# Patient Record
Sex: MALE | Race: BLACK OR AFRICAN AMERICAN | Employment: STUDENT | URBAN - METROPOLITAN AREA
[De-identification: names, ages, dates, MRNs, and addresses within clinical notes are randomized per-mention and may not be internally consistent; named-entity substitution may affect disease eponyms.]

---
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
## 2021-01-14 DIAGNOSIS — G47.00 INSOMNIA, UNSPECIFIED TYPE: ICD-10-CM

## 2021-01-14 DIAGNOSIS — J45.40 MODERATE PERSISTENT ASTHMA WITHOUT COMPLICATION: ICD-10-CM

## 2021-01-14 RX ORDER — MONTELUKAST SODIUM 10 MG/1
TABLET ORAL
Qty: 90 TAB | Refills: 3 | Status: CANCELLED | OUTPATIENT
Start: 2021-01-14

## 2021-01-14 RX ORDER — MELATONIN 10 MG
10 CAPSULE ORAL
Qty: 90 CAP | Refills: 3 | Status: CANCELLED | OUTPATIENT
Start: 2021-01-14

## 2021-01-14 NOTE — TELEPHONE ENCOUNTER
----- Message from Balwinder Lopez sent at 1/14/2021  7:47 AM EST -----  Regarding: YAQUELIN Corona/Refill  Medication Refill    Caller (if not patient):Juaquin Gant      Relationship of caller (if not patient):grandmother      Best contact number(s):[phone]      Name of medication and dosage if known: miconazole nitrate cream antifungal 2% , melatonin 10 mg and Nitrocine 2%      Is patient out of this medication (yes/no): yes      Pharmacy name:[facility]    Pharmacy listed in chart? (yes/no):yes  Pharmacy phone [phone]      Details to clarify the request:Pt grandmother requesting a refill . Pt grandmother needs a order for speech therapy at [facility] if possible  .       Balwinder Lopez

## 2021-01-14 NOTE — TELEPHONE ENCOUNTER
Duplicate request - Singulair 10 mg #90 wit 3 refills & Melatonin 10 mg caps #90 with 3 refills both was sent to [facility] on 09/29/2020. Requested Prescriptions     Pending Prescriptions Disp Refills    melatonin 10 mg capsule 90 Cap 3     Sig: Take 1 Cap by mouth nightly.     montelukast (SINGULAIR) 10 mg tablet 90 Tab 3     Sig: TAKE 1 TABLET BY MOUTH EVERY DAY for allergies and asthma

## 2021-01-14 NOTE — TELEPHONE ENCOUNTER
----- Message from Gavino Thomson sent at 1/14/2021  9:44 AM EST -----  Regarding: LYUBOV Corona/Refill  Medication Refill    Caller (if not patient): Amarilis (Grandmother)      Relationship of caller (if not patient): Grandmother      Best contact number(s): [phone]      Name of medication and dosage if known: Monetelukast 10 mg tab, Melatonin CR 10 mg tab        Is patient out of this medication (yes/no):  No, about 4 pills       Pharmacy name: [facility]    Pharmacy listed in chart? (yes/no): yes  Pharmacy phone number: [phone]      Details to clarify the request:      Gavino Thomson

## 2021-01-25 ENCOUNTER — TELEPHONE (OUTPATIENT)
Dept: FAMILY MEDICINE CLINIC | Age: 21
End: 2021-01-25

## 2021-01-25 DIAGNOSIS — F84.0 AUTISM: Primary | ICD-10-CM

## 2021-01-25 DIAGNOSIS — G47.00 INSOMNIA, UNSPECIFIED TYPE: ICD-10-CM

## 2021-01-25 DIAGNOSIS — L73.9 FOLLICULITIS: ICD-10-CM

## 2021-01-25 DIAGNOSIS — J45.40 MODERATE PERSISTENT ASTHMA WITHOUT COMPLICATION: ICD-10-CM

## 2021-01-25 DIAGNOSIS — B35.6 TINEA CRURIS: ICD-10-CM

## 2021-01-25 RX ORDER — MONTELUKAST SODIUM 10 MG/1
TABLET ORAL
Qty: 90 TAB | Refills: 3 | Status: CANCELLED | OUTPATIENT
Start: 2021-01-25

## 2021-01-25 RX ORDER — KETOCONAZOLE 20 MG/G
CREAM TOPICAL 2 TIMES DAILY
Qty: 60 G | Refills: 2 | Status: SHIPPED | OUTPATIENT
Start: 2021-01-25

## 2021-01-25 RX ORDER — MUPIROCIN 20 MG/G
OINTMENT TOPICAL 2 TIMES DAILY
Qty: 22 G | Refills: 1 | Status: SHIPPED | OUTPATIENT
Start: 2021-01-25 | End: 2021-07-07 | Stop reason: SDUPTHER

## 2021-01-25 RX ORDER — MELATONIN 10 MG
10 CAPSULE ORAL
Qty: 90 CAP | Refills: 3 | Status: CANCELLED | OUTPATIENT
Start: 2021-01-25

## 2021-01-25 NOTE — TELEPHONE ENCOUNTER
----- Message from Ector Taylor sent at 1/23/2021 12:42 PM EST -----  Regarding: LYUBOV Corona/ telephone  General Message/Vendor Calls    Caller's first and last name: Mai Spring (grandmother)       Reason for call: caller requesting refill on medications for pt       Callback required yes/no and why: yes       Best contact number(s): [phone]      Details to clarify the request: n/a       Ector Taylor

## 2021-01-25 NOTE — TELEPHONE ENCOUNTER
Neymar Frost called requesting an order for speech therapy.         [facility]#[phone]

## 2021-01-25 NOTE — TELEPHONE ENCOUNTER
Pt's grandmother's wanted to check on status of paperwork that was dropped off two weeks ago for Kayla Read to fill out. Pt has appointment with him tomorrow morning.            [facility]#[phone]

## 2021-01-25 NOTE — TELEPHONE ENCOUNTER
Duplicate request: Melatonin 10 mg #90 with 3 refills & Singulair 10 mg #90 with 3 refills was sent to [facility] on 09/29/2020. Last visit 10/27/2020 LYUBOV Sales   Next appointment 01/26/2021 LYUBOV Corona   Previous refill encounter(s)   09/29/2020 Bactroban #2 grams with 2 refills,  10/27/2020 Nizoral cream #60 grams with 2 refills     Requested Prescriptions     Pending Prescriptions Disp Refills    ketoconazole (NIZORAL) 2 % topical cream 60 g 2     Sig: Apply  to affected area two (2) times a day.  melatonin 10 mg capsule 90 Cap 3     Sig: Take 1 Cap by mouth nightly.  montelukast (SINGULAIR) 10 mg tablet 90 Tab 3     Sig: TAKE 1 TABLET BY MOUTH EVERY DAY for allergies and asthma    mupirocin (BACTROBAN) 2 % ointment 22 g 1     Sig: Apply  to affected area two (2) times a day.

## 2021-02-12 NOTE — TELEPHONE ENCOUNTER
NP Cj,    Fax request for a new rx for miconazole foot cream by patient. Last rx was 19 and shows  after 20 doses. Entered for bid x 10 days with 1 refill. Attached new rx if appropriate. Thanks, Candie Dean    Last Visit: 20 Suzanna Muir  Next Appointment: 21 LYUBOV Corona  Previous Refill Encounter(s): 19 15g + 1    Requested Prescriptions     Pending Prescriptions Disp Refills    miconazole (MICOTIN) 2 % topical cream 15 g 1     Sig: Apply  to affected area two (2) times a day.

## 2021-02-15 RX ORDER — DOXYLAMINE SUCCINATE 25 MG
TABLET ORAL 2 TIMES DAILY
Qty: 15 G | Refills: 1 | Status: SHIPPED | OUTPATIENT
Start: 2021-02-15 | End: 2021-07-07 | Stop reason: SDUPTHER

## 2021-03-29 ENCOUNTER — TELEPHONE (OUTPATIENT)
Dept: FAMILY MEDICINE CLINIC | Age: 21
End: 2021-03-29

## 2021-03-29 NOTE — TELEPHONE ENCOUNTER
----- Message from Reid Santillan sent at 3/29/2021  8:41 AM EDT -----  Regarding: Javier Corona/Telephone  General Message/Vendor Calls    Caller's first and last name:Christiana(Grandmother) Malik      Reason for call: requesting to know what kind of medication or cream to use for a bleeding broken nail and cut on a finger of the pt      Callback required yes/no and why: yes      Best contact number(s): [phone]      Details to clarify the request: requesting to discuss allergy and sleep meds before the appt on this Thurs.       Reid Santillan

## 2021-03-29 NOTE — TELEPHONE ENCOUNTER
Called Christiana back, she states that her dtr already took pt to the ER @ [facility] for his finger. She said that they are scheduled for an appt on Thursday. They will discuss the allergy and sleeping med then.

## 2021-04-01 ENCOUNTER — OFFICE VISIT (OUTPATIENT)
Dept: FAMILY MEDICINE CLINIC | Age: 21
End: 2021-04-01
Payer: MEDICAID

## 2021-04-01 VITALS
HEIGHT: 70 IN | DIASTOLIC BLOOD PRESSURE: 74 MMHG | TEMPERATURE: 96.6 F | BODY MASS INDEX: 26.34 KG/M2 | SYSTOLIC BLOOD PRESSURE: 129 MMHG | RESPIRATION RATE: 16 BRPM | OXYGEN SATURATION: 96 % | WEIGHT: 184 LBS | HEART RATE: 83 BPM

## 2021-04-01 DIAGNOSIS — S61.012D LACERATION OF LEFT THUMB WITHOUT FOREIGN BODY WITHOUT DAMAGE TO NAIL, SUBSEQUENT ENCOUNTER: ICD-10-CM

## 2021-04-01 DIAGNOSIS — K13.0 DRY LIPS: ICD-10-CM

## 2021-04-01 DIAGNOSIS — G47.00 INSOMNIA, UNSPECIFIED TYPE: ICD-10-CM

## 2021-04-01 DIAGNOSIS — J45.40 MODERATE PERSISTENT ASTHMA WITHOUT COMPLICATION: Primary | ICD-10-CM

## 2021-04-01 PROCEDURE — 99214 OFFICE O/P EST MOD 30 MIN: CPT | Performed by: NURSE PRACTITIONER

## 2021-04-01 RX ORDER — PETROLATUM 42 G/100G
OINTMENT TOPICAL AS NEEDED
Qty: 15 G | Status: SHIPPED | OUTPATIENT
Start: 2021-04-01

## 2021-04-01 RX ORDER — MELATONIN 10 MG
10 CAPSULE ORAL
Qty: 90 CAP | Refills: 3 | Status: SHIPPED | OUTPATIENT
Start: 2021-04-01 | End: 2021-07-07 | Stop reason: SDUPTHER

## 2021-04-01 NOTE — PROGRESS NOTES
Identified pt with two pt identifiers(name and ). Reviewed record in preparation for visit and have obtained necessary documentation. Chief Complaint   Patient presents with    Asthma    Hand Pain     left thumb        Health Maintenance Due   Topic    Hepatitis C Screening     Pneumococcal 0-64 years (1 of 1 - PPSV23)    HPV Age 9Y-34Y (4 - Male 2-dose series)        Visit Vitals  /74 (BP 1 Location: Right arm, BP Patient Position: Sitting, BP Cuff Size: Large adult)   Pulse 83   Temp (!) 96.6 °F (35.9 °C) (Oral)   Resp 16   Ht 5' 10\" (1.778 m)   Wt 184 lb (83.5 kg)   SpO2 96%   BMI 26.40 kg/m²     Pain Scale: /10    Coordination of Care Questionnaire:  :   1. Have you been to the ER, urgent care clinic since your last visit? Hospitalized since your last visit? [facility] 2021 for finger    2. Have you seen or consulted any other health care providers outside of the [address] [place] [address] since your last visit? Include any pap smears or colon screening.  yes

## 2021-04-01 NOTE — PROGRESS NOTES
[facility] Note    Citlalli Albert is a 24 y.o. male who was seen in clinic today (4/1/2021). Subjective:  Laceration  Patient presents for evaluation of a laceration to the left thumb. Injury occurred 5 days ago. The mechanism of the wound was nail clippers. Patient was seen at [address] [place] [address] 2 days after injury. He was unable to have wound closed given his length of time. Family has been cleaning wound with peroxide and applying antibiotic ointment. Asthma Review:  The patient is being seen for follow up of asthma, not currently in exacerbation. Seen by pediatric pulmonology. Patient will not cooperate for sleep study. Asthma symptoms occur: infrequently. Wheezing when present is described as mild-moderate and easily relieved with rescue bronchodilator. Current limitations in activity from asthma: none. Number of days of school or work missed in the last month: 0. Frequency of use of quick-relief meds: weekly. Regimen compliance: The patient reports adherence to this regimen. Patient does not smoke cigarettes. Prior to Admission medications    Medication Sig Start Date End Date Taking? Authorizing Provider   melatonin 10 mg capsule Take 1 Cap by mouth nightly. 4/1/21  Yes Sue Corona NP   mineral oil-hydrophil petrolat (AQUAPHOR) ointment Apply  to affected area as needed for Dry Skin. Please dispense Aquaphor lip repair for patient 4/1/21  Yes Sue Corona NP   miconazole (MICOTIN) 2 % topical cream Apply  to affected area two (2) times a day. 2/15/21  Yes Sue Corona NP   ketoconazole (NIZORAL) 2 % topical cream Apply  to affected area two (2) times a day. 1/25/21  Yes Sue Corona NP   mupirocin (BACTROBAN) 2 % ointment Apply  to affected area two (2) times a day. 1/25/21  Yes Sue Corona NP   budesonide-formoteroL (SYMBICORT) 160-4.5 mcg/actuation HFAA Take 2 Puffs by inhalation two (2) times a day. 10/27/20  Yes Kesha Corona NP   cloNIDine HCL (CATAPRES) 0.1 mg tablet Take 1 Tab by mouth nightly. 9/29/20  Yes Kesha Corona NP   montelukast (SINGULAIR) 10 mg tablet TAKE 1 TABLET BY MOUTH EVERY DAY for allergies and asthma 9/29/20  Yes Kesha Corona NP   albuterol (PROVENTIL VENTOLIN) 2.5 mg /3 mL (0.083 %) nebu INHALE 1 VIAL BY NEBULIZATION EVERY 4 HOURS AS NEEDED FOR WHEEZING OR COUGH 9/2/20  Yes Kesha Corona NP   naproxen (NAPROSYN) 500 mg tablet Take 1 Tab by mouth two (2) times daily as needed for Headache. 7/23/20  Yes Kesha Corona NP   cetirizine (ZYRTEC) 10 mg tablet Take 1 Tab by mouth daily. For allergies 2/20/20  Yes Kesha Corona NP   albuterol (PROVENTIL HFA, VENTOLIN HFA, PROAIR HFA) 90 mcg/actuation inhaler Take 2-4 Puffs by inhalation every four (4) hours as needed for Wheezing or Shortness of Breath. 12/12/19  Yes Yanique Jasmine MD   Ibuprofen-diphenhydrAMINE (ADVIL PM) 200-38 mg tab Take 1 Tab by mouth nightly. Patient taking differently: Take 1 Tab by mouth as needed. 8/23/19  Yes Kesha Corona NP   triamcinolone acetonide (KENALOG) 0.1 % topical cream Apply  to affected area two (2) times a day. use thin layer 6/26/19  Yes Kesha Corona NP   glycerin-mineral oil-lanolin (EUCERIN PLUS) topical cream Apply  to affected area as needed for Dry Skin. 6/26/19  Yes Sade Mueller NP   benztropine (COGENTIN) 0.5 mg tablet Take 0.5 mg by mouth two (2) times a day. Yes Provider, Historical   hydrOXYzine HCl (ATARAX) 10 mg tablet Take  by mouth three (3) times daily as needed for Itching. Yes Provider, Historical   polyethylene glycol (MIRALAX) 17 gram/dose powder Take 17 g by mouth daily. Yes Provider, Historical   risperiDONE (RISPERDAL) 1 mg tablet Take 1 mg by mouth three (3) times daily. Yes Provider, Historical   amphetamine-dextroamphetamine (ADDERALL) 15 mg tablet Take 15 mg by mouth.    Yes Other, MD Kane   melatonin 10 mg cap Take 10 mg by mouth nightly. 9/29/20 4/1/21  Nella Villa NP   mineral oil-hydrophil petrolat (AQUAPHOR) ointment Apply  to affected area as needed for Dry Skin. Please dispense Aquaphor lip repair for patient 6/9/20 4/1/21  Nella RobLYUBOV izaguirre          Allergies   Allergen Reactions    Pcn [Penicillins] Swelling    Milk Hives           ROS  See HPI    Objective:   Physical Exam  Vitals signs and nursing note reviewed. Constitutional:       Appearance: He is well-developed. Cardiovascular:      Rate and Rhythm: Normal rate and regular rhythm. Heart sounds: No murmur. No friction rub. No gallop. Pulmonary:      Effort: Pulmonary effort is normal. No respiratory distress. Breath sounds: Normal breath sounds. Neurological:      Mental Status: He is alert and oriented to person, place, and time. Psychiatric:         Speech: Speech normal.         Behavior: Behavior normal.         Thought Content: Thought content normal.           Visit Vitals  /74 (BP 1 Location: Right arm, BP Patient Position: Sitting, BP Cuff Size: Large adult)   Pulse 83   Temp (!) 96.6 °F (35.9 °C) (Oral)   Resp 16   Ht 5' 10\" (1.778 m)   Wt 184 lb (83.5 kg)   SpO2 96%   BMI 26.40 kg/m²       Assessment & Plan:  Diagnoses and all orders for this visit:    1. Moderate persistent asthma without complication  Stable, no changes to current therapy. Patient requires a new nebulizer machine as his current one is broken. 2. Laceration of left thumb without foreign body without damage to nail, subsequent encounter  Resolving. Reviewed wound care with family. No evidence of infection. 3. Insomnia, unspecified type  -     Refill melatonin 10 mg capsule; Take 1 Cap by mouth nightly. 4. Dry lips  -    Refill mineral oil-hydrophil petrolat (AQUAPHOR) ointment; Apply  to affected area as needed for Dry Skin.  Please dispense Aquaphor lip repair for patient      I have discussed the diagnosis with the patient and the intended plan as seen in the above orders. The patient has received an after-visit summary along with patient information handout. I have discussed medication side effects and warnings with the patient as well. Follow-up and Dispositions    · Return if symptoms worsen or fail to improve.            Melody Pizarro NP

## 2021-04-05 DIAGNOSIS — J20.9 BRONCHITIS, SUBACUTE: ICD-10-CM

## 2021-04-05 NOTE — TELEPHONE ENCOUNTER
Charity Vargas with [facility] orthotic and prosthetic is needing patients demographic sheet     Fax [phone]  [facility]# [phone]      Notes were faxed and confirmed

## 2021-04-06 RX ORDER — ALBUTEROL SULFATE 0.83 MG/ML
SOLUTION RESPIRATORY (INHALATION)
Qty: 150 ML | Refills: 1 | Status: SHIPPED | OUTPATIENT
Start: 2021-04-06

## 2021-04-06 RX ORDER — ALBUTEROL SULFATE 90 UG/1
2-4 AEROSOL, METERED RESPIRATORY (INHALATION)
Qty: 1 INHALER | Refills: 3 | Status: SHIPPED | OUTPATIENT
Start: 2021-04-06 | End: 2021-07-07 | Stop reason: SDUPTHER

## 2021-04-06 NOTE — TELEPHONE ENCOUNTER
Last visit 04/01/2021 LYUBOV Yanez   Next appointment Nothing scheduled  Previous refill encounter(s)   09/02/2020 Albuterol Neb Sol #150 with 1 refill,  10/12/2019 Albuterol HFA INH #1 with 3 refills. Requested Prescriptions     Pending Prescriptions Disp Refills    albuterol (PROVENTIL VENTOLIN) 2.5 mg /3 mL (0.083 %) nebu 150 mL 1     Sig: Use 3 ml via nebulizer every 4 hours as needed for wheezing or cough    albuterol (PROVENTIL HFA, VENTOLIN HFA, PROAIR HFA) 90 mcg/actuation inhaler 1 Inhaler 3     Sig: Take 2-4 Puffs by inhalation every four (4) hours as needed for Wheezing or Shortness of Breath.

## 2021-04-06 NOTE — TELEPHONE ENCOUNTER
Patient's grandmother and care giver      . Requested Prescriptions     Pending Prescriptions Disp Refills    albuterol (PROVENTIL VENTOLIN) 2.5 mg /3 mL (0.083 %) nebu 150 mL 1    albuterol (PROVENTIL HFA, VENTOLIN HFA, PROAIR HFA) 90 mcg/actuation inhaler 1 Inhaler 3     Sig: Take 2-4 Puffs by inhalation every four (4) hours as needed for Wheezing or Shortness of Breath.        [facility]#[phone]

## 2021-04-14 ENCOUNTER — TELEPHONE (OUTPATIENT)
Dept: FAMILY MEDICINE CLINIC | Age: 21
End: 2021-04-14

## 2021-04-14 NOTE — TELEPHONE ENCOUNTER
ATR/UTR/LMOVM  Advised forms were faxed to [facility] on 04/05/2021   They need to call [facility] and see what the hold up is. Provided them the number. We did receive confirmation our fax was received.

## 2021-04-14 NOTE — TELEPHONE ENCOUNTER
----- Message from [facility] sent at 4/14/2021 11:07 AM EDT -----  Regarding: LYUBOV Corona/Telephone  General Message/Vendor Calls    Caller's first and last name:  [facility])      Reason for call:  Status on nebulizer      Callback required yes/no and why:  Y      Best contact number(s):  [phone]      Details to clarify the request:  Ms. Lakesha Meier is requesting a call back with status on getting a nebulizer machine. She stated this is her third call trying to find out when she will be getting this machine, and no one has called her back. She needs to know what's going on with placing an order for a nebulizer for the patient.       [facility]

## 2021-04-21 ENCOUNTER — TELEPHONE (OUTPATIENT)
Dept: FAMILY MEDICINE CLINIC | Age: 21
End: 2021-04-21

## 2021-04-21 NOTE — LETTER
NOTIFICATION TO WORK / SCHOOL 
 
4/22/2021 1:51 PM 
 
Mr. Franco Re [address] [facility] [address] To Whom It May Concern: 
 
Gregg Boggs is currently under the care of TREVA Lind 54. He suffers from asthma and requires a climate controlled bus to prevent exacerbation of the symptoms. If there are questions or concerns please have the patient contact our office. Sincerely, Corrinne Genera, NP

## 2021-04-21 NOTE — TELEPHONE ENCOUNTER
MsAlyson [place] requesting a letter for summer school (starting 04/26/2021) stating due to his  Medical condition (asthma), he requires a climate control bus (air conditioned).         [facility]#[phone] Routine  care and anticipatory guidance

## 2021-05-11 ENCOUNTER — DOCUMENTATION ONLY (OUTPATIENT)
Dept: FAMILY MEDICINE CLINIC | Age: 21
End: 2021-05-11

## 2021-05-11 ENCOUNTER — OFFICE VISIT (OUTPATIENT)
Dept: FAMILY MEDICINE CLINIC | Age: 21
End: 2021-05-11
Payer: MEDICAID

## 2021-05-11 VITALS
HEIGHT: 70 IN | BODY MASS INDEX: 25.77 KG/M2 | RESPIRATION RATE: 16 BRPM | OXYGEN SATURATION: 96 % | HEART RATE: 99 BPM | DIASTOLIC BLOOD PRESSURE: 67 MMHG | SYSTOLIC BLOOD PRESSURE: 118 MMHG | WEIGHT: 180 LBS | TEMPERATURE: 97.6 F

## 2021-05-11 DIAGNOSIS — Z01.818 PREOP EXAMINATION: Primary | ICD-10-CM

## 2021-05-11 PROCEDURE — 99214 OFFICE O/P EST MOD 30 MIN: CPT | Performed by: NURSE PRACTITIONER

## 2021-05-11 NOTE — PATIENT INSTRUCTIONS
Learning About How to Prepare for Surgery How can you prepare before surgery? You can do some things that will help you safely prepare for surgery. · Understand exactly what surgery is planned. You should know the risks, benefits, and other options. · Tell your doctors ALL the medicines, vitamins, supplements, and herbal remedies you take. Some of these can increase the risk of bleeding. Or they may interact with anesthesia. · Follow your doctor's instructions about which medicines to take or stop before your surgery. ? You may need to stop taking some medicines a week or more before surgery. ? If you take aspirin or some other blood thinner, be sure to talk to your doctor. · Follow any other instructions your doctor gave you. · If you have an advance directive, let your doctor know, and bring a copy to the hospital.  
It may include a living will and a durable power of  for health care. It lets your doctor and loved ones know your health care wishes. If you don't have one, you may want to prepare one. How can you prepare on the day of surgery? Here are some tips about what to do at home before you leave for your surgery. · If your doctor told you to take your medicines on the day of surgery, take them with only a sip of water. · Follow the instructions about when to stop eating and drinking. If you don't, your surgery may be canceled. · Follow your doctor's instructions about when to bathe or shower before your surgery. · Do not shave the surgical site yourself. · Take off all jewelry and piercings. · Take out contact lenses, if you wear them. · Have a picture ID ready to take with you. Your ID will be checked before your surgery. · Know when to call your doctor. Call your doctor if you: 
? Become ill before surgery. ? Need to reschedule. ? Have changed your mind about having the surgery. What happens before surgery?  
Here are some things you can expect to happen before your surgery. · Your picture ID will be checked. · The area of your body that needs surgery is often marked to make sure there are no errors. · You will be kept comfortable and safe by your anesthesia provider. The anesthesia may make you sleep. Or it may just numb the area being worked on. What happens when you are ready to go home? Be sure you have someone drive you home. Anesthesia and pain medicine make it unsafe for you to drive. You will get instructions about recovering from your surgery. This is called a discharge plan. It will cover things like diet, wound care, follow-up care, driving, and getting back to your normal routine. Follow-up care is a key part of your treatment and safety. Be sure to make and go to all appointments, and call your doctor if you are having problems. It's also a good idea to know your test results and keep a list of the medicines you take. Where can you learn more? Go to http://www.gray.com/ Enter Q270 in the search box to learn more about \"Learning About How to Prepare for Surgery. \" Current as of: May 27, 2020               Content Version: 12.8 © 7179-0340 [employer]. Care instructions adapted under license by [employer] (which disclaims liability or warranty for this information). If you have questions about a medical condition or this instruction, always ask your healthcare professional. [address] any warranty or liability for your use of this information.

## 2021-05-11 NOTE — PROGRESS NOTES
Preoperative Evaluation    Date of Exam: 2021    Vanda White is a 24 y.o. male (:2000) who presents for preoperative evaluation for dental procedure. Latex Allergy: no    Medical History:     Past Medical History:   Diagnosis Date    ADHD, impulsive type     Asthma     Autism     Constipation     [facility]    Developmental delay     Headache      Pediatric Neuro: Dr. Antonio Wiseman. MRI of brain normal in 2018. No follow up indicated.  Insomnia     ODD (oppositional defiant disorder)     Sleep apnea     Weight loss 2018    [facility] - recomended clear ensures. Resolved 2018     Allergies: Allergies   Allergen Reactions    Pcn [Penicillins] Swelling    Milk Hives      Medications:     Current Outpatient Medications   Medication Sig    albuterol (PROVENTIL VENTOLIN) 2.5 mg /3 mL (0.083 %) nebu Use 3 ml via nebulizer every 4 hours as needed for wheezing or cough    albuterol (PROVENTIL HFA, VENTOLIN HFA, PROAIR HFA) 90 mcg/actuation inhaler Take 2-4 Puffs by inhalation every four (4) hours as needed for Wheezing or Shortness of Breath.  melatonin 10 mg capsule Take 1 Cap by mouth nightly.  mineral oil-hydrophil petrolat (AQUAPHOR) ointment Apply  to affected area as needed for Dry Skin. Please dispense Aquaphor lip repair for patient    miconazole (MICOTIN) 2 % topical cream Apply  to affected area two (2) times a day.  ketoconazole (NIZORAL) 2 % topical cream Apply  to affected area two (2) times a day.  mupirocin (BACTROBAN) 2 % ointment Apply  to affected area two (2) times a day.  budesonide-formoteroL (SYMBICORT) 160-4.5 mcg/actuation HFAA Take 2 Puffs by inhalation two (2) times a day.  cloNIDine HCL (CATAPRES) 0.1 mg tablet Take 1 Tab by mouth nightly.     montelukast (SINGULAIR) 10 mg tablet TAKE 1 TABLET BY MOUTH EVERY DAY for allergies and asthma    naproxen (NAPROSYN) 500 mg tablet Take 1 Tab by mouth two (2) times daily as needed for Headache.  cetirizine (ZYRTEC) 10 mg tablet Take 1 Tab by mouth daily. For allergies    Ibuprofen-diphenhydrAMINE (ADVIL PM) 200-38 mg tab Take 1 Tab by mouth nightly. (Patient taking differently: Take 1 Tab by mouth as needed.)    triamcinolone acetonide (KENALOG) 0.1 % topical cream Apply  to affected area two (2) times a day. use thin layer    glycerin-mineral oil-lanolin (EUCERIN PLUS) topical cream Apply  to affected area as needed for Dry Skin.  benztropine (COGENTIN) 0.5 mg tablet Take 0.5 mg by mouth two (2) times a day.  hydrOXYzine HCl (ATARAX) 10 mg tablet Take  by mouth three (3) times daily as needed for Itching.  polyethylene glycol (MIRALAX) 17 gram/dose powder Take 17 g by mouth daily.  risperiDONE (RISPERDAL) 1 mg tablet Take 1 mg by mouth three (3) times daily.  amphetamine-dextroamphetamine (ADDERALL) 15 mg tablet Take 15 mg by mouth. No current facility-administered medications for this visit. Surgical History:     Past Surgical History:   Procedure Laterality Date    HX TONSIL AND ADENOIDECTOMY       Social History:     Social History     Socioeconomic History    Marital status: SINGLE     Spouse name: Not on file    Number of children: Not on file    Years of education: Not on file    Highest education level: Not on file   Tobacco Use    Smoking status: Never Smoker    Smokeless tobacco: Never Used   Substance and Sexual Activity    Alcohol use: No    Drug use: No    Sexual activity: Never   Social History Narrative    Lives with Grandmother. Occasional uncle will be in the home. No history of abuse. Goes to school at [employer]. Enjoys school. Also involved in independent living program after school hours.         Anesthesia Complications: None  History of abnormal bleeding : None  History of Blood Transfusions: no  Health Care Directive or Living Will: no    Objective:     ROS: Feeling well. No dyspnea or chest pain on exertion. No abdominal pain, change in bowel habits, black or bloody stools. No urinary tract or prostatic symptoms. No neurological complaints. OBJECTIVE:   The patient appears well, alert, in no distress. Visit Vitals  /67 (BP 1 Location: Left upper arm, BP Patient Position: Sitting)   Pulse 99   Temp 97.6 °F (36.4 °C) (Temporal)   Resp 16   Ht 5' 10\" (1.778 m)   Wt 180 lb (81.6 kg)   SpO2 96%   BMI 25.83 kg/m²     ENT normal.  Neck supple. No adenopathy or thyromegaly. ORALIA. Lungs are clear, good air entry, no wheezes, rhonchi or rales. Cardiovascular:S1 and S2 normal, no murmurs, regular rate and rhythm. Abdomen is soft without tenderness, guarding, mass or organomegaly. Extremities show no edema, normal peripheral pulses. Neurological is normal without focal findings. IMPRESSION:   Based on the above evaluation, the patient is stable and cleared for surgery.         Joe Pepper NP   5/11/2021

## 2021-05-11 NOTE — PROGRESS NOTES
Pre op clearance faxed to [facility] dentistry at [phone]. Confirmation received.

## 2021-05-11 NOTE — PROGRESS NOTES
Chief Complaint   Patient presents with    Pre-op Exam     dental surgery 5/14/21     1. Have you been to the ER, urgent care clinic since your last visit? Hospitalized since your last visit? No    2. Have you seen or consulted any other health care providers outside of the [address] [place] [address] since your last visit? Include any pap smears or colon screening.  No

## 2021-05-12 ENCOUNTER — TELEPHONE (OUTPATIENT)
Dept: FAMILY MEDICINE CLINIC | Age: 21
End: 2021-05-12

## 2021-05-12 NOTE — TELEPHONE ENCOUNTER
Wants the physical faxed over today. Waiting for covid test to come back    Physical Cordinator David Correa    [phone]  Fax: [phone]    Will be calling back.

## 2021-06-23 ENCOUNTER — TELEPHONE (OUTPATIENT)
Dept: FAMILY MEDICINE CLINIC | Age: 21
End: 2021-06-23

## 2021-06-23 NOTE — TELEPHONE ENCOUNTER
Grandma called stating pt is needing a referral for speech and for something else (unsure of what she said, couldn't understand her) Pt has a VV scheduled with Dr. Reinier Sal on July 7 (first available) but is needing these referrals before 7/7. Is Kita Simeon able to do this?     BCB# [phone]

## 2021-06-23 NOTE — TELEPHONE ENCOUNTER
Patient's grandmother called and requested updated info on the Pt fax to his school for IEP. Stating that Pt still has sleep disorder. She was not able to give more details, said Alex San knows what she is referring to.        BCB# [phone]    FAX # [phone]    School # [phone]    Att: Laurie Spivey

## 2021-06-23 NOTE — TELEPHONE ENCOUNTER
Returned call to patients grandmother. Name and  verified. Grandmother stated that she needed a referral for ST. Advised ST referral was placed in January and signed order faxed back to [facility] in [facility]. She stated she does not know \"Mrs. Mary Magallon just said she needed a referral.\" grandmother provided me with number however it was a fax. Referral faxed to [phone] received confirmation.

## 2021-06-24 NOTE — TELEPHONE ENCOUNTER
Call placed to school regarding patient to see what information was needed. Spoke with . She provided email address for Ms Jillian Allen ([email or web address]) nothing is needed at this time. This is just that we can communicate with school regarding patient.

## 2021-06-25 NOTE — TELEPHONE ENCOUNTER
Christiana Gant(Grandmother) (EC) [phone]      PT grandmother called and stated she still needs a referral for the feeding program at [facility]. Please send the referral to [phone]. Please call grandmother back and confirm that all documents are sent. Pt grandmother worried something may not have been completed.  (Speech sent 6/23)

## 2021-06-29 ENCOUNTER — TELEPHONE (OUTPATIENT)
Dept: FAMILY MEDICINE CLINIC | Age: 21
End: 2021-06-29

## 2021-06-29 NOTE — TELEPHONE ENCOUNTER
Has been trying for a week to get a referral for program patient has eating disorder, no referral is in place. Please advise.       [phone] Brian Wagner

## 2021-06-30 NOTE — TELEPHONE ENCOUNTER
Grandmother called again. Referral for evaluation is to be made to [facility] for eating disorder. Please make to the Attn: Grace  Fax # [phone]    Please call the grandmother back. She also needs to know if she can get medications filled now or if she has to wait until her appointment.

## 2021-07-01 DIAGNOSIS — F84.0 AUTISM: Primary | ICD-10-CM

## 2021-07-01 DIAGNOSIS — R62.50 DEVELOPMENTAL DELAY: ICD-10-CM

## 2021-07-01 NOTE — TELEPHONE ENCOUNTER
Returned call to Alin. Patients grandmother who is on PHI. Patients name and  verified.  Advised that referral for the feeding program has been faxed she was appreciative of call

## 2021-07-07 ENCOUNTER — VIRTUAL VISIT (OUTPATIENT)
Dept: FAMILY MEDICINE CLINIC | Age: 21
End: 2021-07-07
Payer: MEDICAID

## 2021-07-07 DIAGNOSIS — B35.6 TINEA CRURIS: Primary | ICD-10-CM

## 2021-07-07 DIAGNOSIS — J20.9 BRONCHITIS, SUBACUTE: ICD-10-CM

## 2021-07-07 DIAGNOSIS — G47.00 INSOMNIA, UNSPECIFIED TYPE: ICD-10-CM

## 2021-07-07 DIAGNOSIS — F84.0 AUTISM: ICD-10-CM

## 2021-07-07 DIAGNOSIS — R62.50 DEVELOPMENTAL DELAY: ICD-10-CM

## 2021-07-07 DIAGNOSIS — F51.01 PRIMARY INSOMNIA: ICD-10-CM

## 2021-07-07 DIAGNOSIS — J45.40 MODERATE PERSISTENT ASTHMA WITHOUT COMPLICATION: ICD-10-CM

## 2021-07-07 DIAGNOSIS — L73.9 FOLLICULITIS: ICD-10-CM

## 2021-07-07 DIAGNOSIS — F90.1 ADHD, IMPULSIVE TYPE: ICD-10-CM

## 2021-07-07 DIAGNOSIS — F40.231 SEVERE NEEDLE PHOBIA: ICD-10-CM

## 2021-07-07 PROCEDURE — 99213 OFFICE O/P EST LOW 20 MIN: CPT | Performed by: FAMILY MEDICINE

## 2021-07-07 RX ORDER — ALBUTEROL SULFATE 90 UG/1
2-4 AEROSOL, METERED RESPIRATORY (INHALATION)
Qty: 1 INHALER | Refills: 3 | Status: SHIPPED | OUTPATIENT
Start: 2021-07-07

## 2021-07-07 RX ORDER — DOXYLAMINE SUCCINATE 25 MG
TABLET ORAL 2 TIMES DAILY
Qty: 15 G | Refills: 1 | Status: SHIPPED | OUTPATIENT
Start: 2021-07-07 | End: 2021-11-22 | Stop reason: SDUPTHER

## 2021-07-07 RX ORDER — MUPIROCIN 20 MG/G
OINTMENT TOPICAL 2 TIMES DAILY
Qty: 22 G | Refills: 1 | Status: SHIPPED | OUTPATIENT
Start: 2021-07-07 | End: 2021-11-22 | Stop reason: SDUPTHER

## 2021-07-07 RX ORDER — BUDESONIDE AND FORMOTEROL FUMARATE DIHYDRATE 160; 4.5 UG/1; UG/1
2 AEROSOL RESPIRATORY (INHALATION) 2 TIMES DAILY
Qty: 1 INHALER | Refills: 5 | Status: SHIPPED | OUTPATIENT
Start: 2021-07-07

## 2021-07-07 RX ORDER — MELATONIN 10 MG
10 CAPSULE ORAL
Qty: 90 CAPSULE | Refills: 3 | Status: SHIPPED | OUTPATIENT
Start: 2021-07-07

## 2021-07-07 NOTE — PROGRESS NOTES
Marquis SandraMartellGant  [address] y.o. male  2000  [address] [place] [id number]  [id number]     TREVA Lind 54       Encounter Date: 7/7/2021           Established Patient Visit Note: Yeimi Pizarro MD    Reason for Appointment:  Chief Complaint   Patient presents with    Follow-up       History of Present Illness:  History provided by patient    Ascencion Nayak is a [address] y.o. male who presents today for:    · Asthma: his caregivers reports that he has been coughing a little more over the last few weeks. He has been using Symbicort as prescribed. He is followed by asthma specialist.   · Yeast Infection: groin area; she reports that the miconazole was helpful and they would refil. · MRSA on Back: uses mupirocin  · Seborhheic Derm: uses ketoconazole  · Autism/ADHD/DD: last saw psychiatry two weeks ago with no changes to meds; next apt in Sept  · Last saw dentist in May. · Hist mother reports that he will not allow blood work to be taken or immunizations to be given. He was taken off of some of his psychiatry medicatons d/t this. Review of Systems  Review of Systems   Constitutional: Negative for chills and fever. Respiratory: Negative for cough, shortness of breath and wheezing. Cardiovascular: Negative for chest pain and palpitations.           Allergies: Pcn [penicillins] and Milk    Medications: (Updated to reflect final medication list after visit)    Current Outpatient Medications:     melatonin 10 mg capsule, Take 1 Capsule by mouth nightly., Disp: 90 Capsule, Rfl: 3    budesonide-formoteroL (SYMBICORT) 160-4.5 mcg/actuation HFAA, Take 2 Puffs by inhalation two (2) times a day., Disp: 1 Inhaler, Rfl: 5    albuterol (PROVENTIL HFA, VENTOLIN HFA, PROAIR HFA) 90 mcg/actuation inhaler, Take 2-4 Puffs by inhalation every four (4) hours as needed for Wheezing or Shortness of Breath., Disp: 1 Inhaler, Rfl: 3    miconazole (MICOTIN) 2 % topical cream, Apply  to affected area two (2) times a day., Disp: 15 g, Rfl: 1    mupirocin (BACTROBAN) 2 % ointment, Apply  to affected area two (2) times a day., Disp: 22 g, Rfl: 1    albuterol (PROVENTIL VENTOLIN) 2.5 mg /3 mL (0.083 %) nebu, Use 3 ml via nebulizer every 4 hours as needed for wheezing or cough, Disp: 150 mL, Rfl: 1    mineral oil-hydrophil petrolat (AQUAPHOR) ointment, Apply  to affected area as needed for Dry Skin. Please dispense Aquaphor lip repair for patient, Disp: 15 g, Rfl: prn    ketoconazole (NIZORAL) 2 % topical cream, Apply  to affected area two (2) times a day., Disp: 60 g, Rfl: 2    cloNIDine HCL (CATAPRES) 0.1 mg tablet, Take 1 Tab by mouth nightly., Disp: 90 Tab, Rfl: 1    montelukast (SINGULAIR) 10 mg tablet, TAKE 1 TABLET BY MOUTH EVERY DAY for allergies and asthma, Disp: 90 Tab, Rfl: 3    naproxen (NAPROSYN) 500 mg tablet, Take 1 Tab by mouth two (2) times daily as needed for Headache., Disp: 30 Tab, Rfl: 1    cetirizine (ZYRTEC) 10 mg tablet, Take 1 Tab by mouth daily. For allergies, Disp: 90 Tab, Rfl: 3    Ibuprofen-diphenhydrAMINE (ADVIL PM) 200-38 mg tab, Take 1 Tab by mouth nightly. (Patient taking differently: Take 1 Tab by mouth as needed.), Disp: 30 Tab, Rfl: 1    triamcinolone acetonide (KENALOG) 0.1 % topical cream, Apply  to affected area two (2) times a day. use thin layer, Disp: 454 g, Rfl: 0    glycerin-mineral oil-lanolin (EUCERIN PLUS) topical cream, Apply  to affected area as needed for Dry Skin., Disp: 113 g, Rfl: prn    benztropine (COGENTIN) 0.5 mg tablet, Take 0.5 mg by mouth two (2) times a day., Disp: , Rfl:     hydrOXYzine HCl (ATARAX) 10 mg tablet, Take  by mouth three (3) times daily as needed for Itching., Disp: , Rfl:     polyethylene glycol (MIRALAX) 17 gram/dose powder, Take 17 g by mouth daily. , Disp: , Rfl:     risperiDONE (RISPERDAL) 1 mg tablet, Take 1 mg by mouth three (3) times daily. , Disp: , Rfl:     amphetamine-dextroamphetamine (ADDERALL) 15 mg tablet, Take 15 mg by mouth., Disp: , Rfl:     History  Patient Care Team:  Alejandra Romo NP as PCP - General (Nurse Practitioner)  Alejandar Romo NP as PCP - [facility] Provider  Rome Koch MD as Physician (Pediatric Neurology)  David Baker MD (Allergy)    Past Medical History: he has a past medical history of ADHD, impulsive type, Asthma, Autism, Constipation, Developmental delay, Headache, Insomnia, ODD (oppositional defiant disorder), Sleep apnea, and Weight loss (07/2018). Past Surgical History: he has a past surgical history that includes hx tonsil and adenoidectomy. Family Medical History: family history includes Asthma in his father, maternal grandmother, mother, and sister; Cancer in his maternal grandfather; Headache in his maternal grandmother; Migraines in his mother; Thyroid Cancer in his mother; Thyroid Disease in his mother. Social History: he reports that he has never smoked. He has never used smokeless tobacco. He reports that he does not drink alcohol and does not use drugs. Objective:     Physical Exam    Constitutional:       General: Not in acute distress. Appearance: Normal appearance. Not ill-appearing,  Not toxic-appearing. HENT:      Head: Normocephalic. Right Ear: External ear normal.      Left Ear: External ear normal.      Nose: Nose normal.   Eyes:      General: No scleral icterus. Right eye: No discharge. Left eye: No discharge. Extraocular Movements: Extraocular movements intact. Conjunctiva/sclera: Conjunctivae normal.   Neck:      Musculoskeletal: Normal range of motion. Pulmonary:      Effort: Pulmonary effort is normal. No respiratory distress. Neurological:      Mental Status: Mental status is at baseline. Psychiatric:         Mood and Affect: Mood normal.         Behavior: Behavior normal.         Thought Content:  Thought content normal.         Judgment: Judgment normal.       Assessment & Plan: ICD-10-CM ICD-9-CM    1. Tinea cruris  B35.6 110.3 miconazole (MICOTIN) 2 % topical cream   2. Insomnia, unspecified type  G47.00 780.52 melatonin 10 mg capsule   3. Moderate persistent asthma without complication  B69.42 350.40 budesonide-formoteroL (SYMBICORT) 160-4.5 mcg/actuation HFAA      albuterol (PROVENTIL HFA, VENTOLIN HFA, PROAIR HFA) 90 mcg/actuation inhaler   4. Bronchitis, subacute  J20.9 466.0    5. Folliculitis  U48.0 894.4 mupirocin (BACTROBAN) 2 % ointment   6. Primary insomnia  F51.01 307.42 melatonin 10 mg capsule   7. ADHD, impulsive type  F90.1 314.01    8. Autism  F84.0 299.00    9. Developmental delay  R62.50 783.40    10. Severe needle phobia  F40.231 300.29      All conditions listed above: chronic and stable. Will continue current treatment regimen. Unable to check labs or provide immunizations d/t severe needle phobia. Discussed following up with specialist as scheduled. Discussed recommendations for diet and exercise. I was in the office while conducting this encounter. Consent:  He and/or his healthcare decision maker is aware that this patient-initiated Telehealth encounter is a billable service, with coverage as determined by his insurance carrier. He is aware that he may receive a bill and has provided verbal consent to proceed: Yes    This virtual visit was conducted via [employer]. Pursuant to the emergency declaration under the [facility], [facility] waiver authority and the [employer] and [employer] Act, this Virtual  Visit was conducted to reduce the patient's risk of exposure to COVID-19 and provide continuity of care for an established patient. Services were provided through a video synchronous discussion virtually to substitute for in-person clinic visit. Due to this being a TeleHealth evaluation, many elements of the physical examination are unable to be assessed.      Total Time: minutes: 21-30 minutes. I have discussed the diagnosis with the patient and the intended plan as seen in the above orders. The patient has received an after-visit summary along with patient information handout. I have discussed medication side effects and warnings with the patient as well. Disposition  Follow-up and Dispositions    · Return in about 8 weeks (around 9/1/2021).            Cesar Reilly MD

## 2021-07-13 PROBLEM — F51.01 PRIMARY INSOMNIA: Status: ACTIVE | Noted: 2021-07-13

## 2021-07-13 PROBLEM — J20.9 BRONCHITIS, SUBACUTE: Status: ACTIVE | Noted: 2021-07-13

## 2021-07-13 PROBLEM — B35.6 TINEA CRURIS: Status: ACTIVE | Noted: 2021-07-13

## 2021-07-13 PROBLEM — L73.9 FOLLICULITIS: Status: ACTIVE | Noted: 2021-07-13

## 2021-07-13 PROBLEM — F40.231 SEVERE NEEDLE PHOBIA: Status: ACTIVE | Noted: 2021-07-13

## 2021-08-12 ENCOUNTER — TELEPHONE (OUTPATIENT)
Dept: FAMILY MEDICINE CLINIC | Age: 21
End: 2021-08-12

## 2021-08-12 NOTE — TELEPHONE ENCOUNTER
----- Message from Dyana Geiger sent at 2021  9:54 AM EDT -----  Regarding: Dr. Natalia Humphrey Message/Vendor Calls    Caller's first and last name: [facility]      Reason for call: re: med record request for incontinent supplies; certificate of medical necessities for 3 pts listed below      Callback required yes/no and why: yes      Best contact number(s): [phone]       Details to clarify the request: all 4 pts are former pt's of Javier Luis  [birth year]; Emerald Stoll  1930; Cody Saha  70      Dyana Geiger

## 2021-08-20 NOTE — TELEPHONE ENCOUNTER
LVM that they can send fax to [phone]. Pt will need OV for form completion.  Last form completed 3/24/21

## 2021-10-01 DIAGNOSIS — J45.40 MODERATE PERSISTENT ASTHMA WITHOUT COMPLICATION: ICD-10-CM

## 2021-10-01 RX ORDER — MONTELUKAST SODIUM 10 MG/1
TABLET ORAL
Qty: 90 TABLET | Refills: 3 | Status: SHIPPED | OUTPATIENT
Start: 2021-10-01

## 2021-10-01 NOTE — TELEPHONE ENCOUNTER
Last Visit: VV 7/7/21 MD Soraida Westbrook  Next Appointment: Not scheduled  Previous Refill Encounter(s): 9/29/20 90 + 3    Requested Prescriptions     Pending Prescriptions Disp Refills    montelukast (SINGULAIR) 10 mg tablet 90 Tablet 3     Sig: TAKE 1 TABLET BY MOUTH EVERY DAY for allergies and asthma

## 2021-10-07 ENCOUNTER — TELEPHONE (OUTPATIENT)
Dept: FAMILY MEDICINE CLINIC | Age: 21
End: 2021-10-07

## 2021-11-22 ENCOUNTER — TELEPHONE (OUTPATIENT)
Dept: FAMILY MEDICINE CLINIC | Age: 21
End: 2021-11-22

## 2021-11-22 ENCOUNTER — VIRTUAL VISIT (OUTPATIENT)
Dept: FAMILY MEDICINE CLINIC | Age: 21
End: 2021-11-22
Payer: MEDICAID

## 2021-11-22 DIAGNOSIS — K59.04 CHRONIC IDIOPATHIC CONSTIPATION: ICD-10-CM

## 2021-11-22 DIAGNOSIS — Z13.220 SCREENING, LIPID: ICD-10-CM

## 2021-11-22 DIAGNOSIS — K21.9 GASTROESOPHAGEAL REFLUX DISEASE WITHOUT ESOPHAGITIS: ICD-10-CM

## 2021-11-22 DIAGNOSIS — Z13.1 SCREENING FOR DIABETES MELLITUS: ICD-10-CM

## 2021-11-22 DIAGNOSIS — F84.0 AUTISM: ICD-10-CM

## 2021-11-22 DIAGNOSIS — R62.50 DEVELOPMENTAL DELAY: ICD-10-CM

## 2021-11-22 DIAGNOSIS — J45.40 MODERATE PERSISTENT ASTHMA WITHOUT COMPLICATION: ICD-10-CM

## 2021-11-22 DIAGNOSIS — Z13.29 SCREENING FOR THYROID DISORDER: ICD-10-CM

## 2021-11-22 DIAGNOSIS — B35.6 TINEA CRURIS: ICD-10-CM

## 2021-11-22 DIAGNOSIS — L73.9 FOLLICULITIS: ICD-10-CM

## 2021-11-22 DIAGNOSIS — F90.1 ADHD, IMPULSIVE TYPE: ICD-10-CM

## 2021-11-22 DIAGNOSIS — Z13.0 SCREENING FOR DEFICIENCY ANEMIA: ICD-10-CM

## 2021-11-22 DIAGNOSIS — B37.42 CANDIDAL BALANITIS: Primary | ICD-10-CM

## 2021-11-22 PROCEDURE — 99213 OFFICE O/P EST LOW 20 MIN: CPT | Performed by: FAMILY MEDICINE

## 2021-11-22 RX ORDER — DOCUSATE SODIUM 250 MG
250 CAPSULE ORAL
Qty: 90 CAPSULE | Refills: 1 | Status: SHIPPED | OUTPATIENT
Start: 2021-11-22 | End: 2021-11-23

## 2021-11-22 RX ORDER — POLYETHYLENE GLYCOL 3350 17 G/17G
17 POWDER, FOR SOLUTION ORAL
Qty: 5 PACKET | Refills: 1 | Status: SHIPPED | OUTPATIENT
Start: 2021-11-22 | End: 2022-02-07 | Stop reason: SDUPTHER

## 2021-11-22 RX ORDER — MUPIROCIN 20 MG/G
OINTMENT TOPICAL 2 TIMES DAILY
Qty: 22 G | Refills: 1 | Status: SHIPPED | OUTPATIENT
Start: 2021-11-22 | End: 2022-07-15

## 2021-11-22 RX ORDER — OMEPRAZOLE 40 MG/1
40 CAPSULE, DELAYED RELEASE ORAL DAILY
Qty: 90 CAPSULE | Refills: 1 | Status: SHIPPED | OUTPATIENT
Start: 2021-11-22 | End: 2022-07-15

## 2021-11-22 RX ORDER — DOXYLAMINE SUCCINATE 25 MG
TABLET ORAL 2 TIMES DAILY
Qty: 15 G | Refills: 1 | Status: SHIPPED | OUTPATIENT
Start: 2021-11-22 | End: 2022-04-18 | Stop reason: SDUPTHER

## 2021-11-22 NOTE — PROGRESS NOTES
Marquis oBggs  22 y.o. male  2000  [address] [place] [id number]  [phone]     [address]       Encounter Date: 11/22/2021           Established Patient Visit Note: Patel Elizabeth MD    Reason for Appointment:  Chief Complaint   Patient presents with    Follow-up       History of Present Illness:  History provided by patient    Hemal Guerrier is a 22 y.o. male who presents today for:    · Constipation: duration of 5-6 years; using stool softener. Recently saw GI (Dr. Mariama Sam)  · GERD: followed by GI (Dr. Mariama Sam), takes omeprazole 40mg daily  · Asthma: Using Symbicort as prescribed. He is followed by asthma specialist (Dr. Ria Davey) with last visit about one month ago. · Candidal Balanitis: patient previously followed by peds urology. His mother reports that the miconazole helps and they would like refill. · MRSA on Back: uses mupirocin  · Seborhheic Derm: uses ketoconazole  · Autism/ADHD/DD: followed by psychiatry     Hist mother reports that he will not allow blood work to be taken or immunizations to be given. He was taken off of some of his psychiatry medicatons d/t this. Review of Systems   Unable to obtain: mental acuity    Allergies: Pcn [penicillins] and Milk    Medications: (Updated to reflect final medication list after visit)    Current Outpatient Medications:     mupirocin (BACTROBAN) 2 % ointment, Apply  to affected area two (2) times a day., Disp: 22 g, Rfl: 1    miconazole (MICOTIN) 2 % topical cream, Apply  to affected area two (2) times a day., Disp: 15 g, Rfl: 1    omeprazole (PRILOSEC) 40 mg capsule, Take 1 Capsule by mouth daily. , Disp: 90 Capsule, Rfl: 1    polyethylene glycol (MIRALAX) 17 gram packet, Take 1 Packet by mouth daily as needed for Constipation. , Disp: 5 Packet, Rfl: 1    docusate sodium (COLACE) 100 mg capsule, Take 1 Capsule by mouth two (2) times daily as needed for Constipation. , Disp: 180 Capsule, Rfl: 0    montelukast (SINGULAIR) 10 mg tablet, TAKE 1 TABLET BY MOUTH EVERY DAY for allergies and asthma, Disp: 90 Tablet, Rfl: 3    melatonin 10 mg capsule, Take 1 Capsule by mouth nightly., Disp: 90 Capsule, Rfl: 3    budesonide-formoteroL (SYMBICORT) 160-4.5 mcg/actuation HFAA, Take 2 Puffs by inhalation two (2) times a day., Disp: 1 Inhaler, Rfl: 5    albuterol (PROVENTIL HFA, VENTOLIN HFA, PROAIR HFA) 90 mcg/actuation inhaler, Take 2-4 Puffs by inhalation every four (4) hours as needed for Wheezing or Shortness of Breath., Disp: 1 Inhaler, Rfl: 3    albuterol (PROVENTIL VENTOLIN) 2.5 mg /3 mL (0.083 %) nebu, Use 3 ml via nebulizer every 4 hours as needed for wheezing or cough, Disp: 150 mL, Rfl: 1    mineral oil-hydrophil petrolat (AQUAPHOR) ointment, Apply  to affected area as needed for Dry Skin. Please dispense Aquaphor lip repair for patient, Disp: 15 g, Rfl: prn    ketoconazole (NIZORAL) 2 % topical cream, Apply  to affected area two (2) times a day., Disp: 60 g, Rfl: 2    cloNIDine HCL (CATAPRES) 0.1 mg tablet, Take 1 Tab by mouth nightly., Disp: 90 Tab, Rfl: 1    naproxen (NAPROSYN) 500 mg tablet, Take 1 Tab by mouth two (2) times daily as needed for Headache., Disp: 30 Tab, Rfl: 1    cetirizine (ZYRTEC) 10 mg tablet, Take 1 Tab by mouth daily. For allergies, Disp: 90 Tab, Rfl: 3    Ibuprofen-diphenhydrAMINE (ADVIL PM) 200-38 mg tab, Take 1 Tab by mouth nightly. (Patient taking differently: Take 1 Tab by mouth as needed.), Disp: 30 Tab, Rfl: 1    triamcinolone acetonide (KENALOG) 0.1 % topical cream, Apply  to affected area two (2) times a day.  use thin layer, Disp: 454 g, Rfl: 0    glycerin-mineral oil-lanolin (EUCERIN PLUS) topical cream, Apply  to affected area as needed for Dry Skin., Disp: 113 g, Rfl: prn    benztropine (COGENTIN) 0.5 mg tablet, Take 0.5 mg by mouth two (2) times a day., Disp: , Rfl:     hydrOXYzine HCl (ATARAX) 10 mg tablet, Take  by mouth three (3) times daily as needed for Itching., Disp: , Rfl:     risperiDONE (RISPERDAL) 1 mg tablet, Take 1 mg by mouth three (3) times daily. , Disp: , Rfl:     amphetamine-dextroamphetamine (ADDERALL) 15 mg tablet, Take 15 mg by mouth., Disp: , Rfl:     History  Patient Care Team:  Jeanie Yee MD as PCP - General (Family Medicine)  Jeanie Yee MD as PCP - [address] [place] [address] Provider  Klaus Paz MD as Physician (Pediatric Neurology)  Brett Ernst MD (Allergy)    Past Medical History: he has a past medical history of ADHD, impulsive type, Asthma, Autism, Constipation, Developmental delay, Headache, Insomnia, ODD (oppositional defiant disorder), Sleep apnea, and Weight loss (07/2018). Past Surgical History: he has a past surgical history that includes hx tonsil and adenoidectomy. Family Medical History: family history includes Asthma in his father, maternal grandmother, mother, and sister; Cancer in his maternal grandfather; Headache in his maternal grandmother; Migraines in his mother; Thyroid Cancer in his mother; Thyroid Disease in his mother. Social History: he reports that he has never smoked. He has never used smokeless tobacco. He reports that he does not drink alcohol and does not use drugs. Objective:     Physical Exam    Constitutional:       General: Not in acute distress. Appearance: Normal appearance. Not ill-appearing,  Not toxic-appearing. HENT:      Head: Normocephalic. Right Ear: External ear normal.      Left Ear: External ear normal.      Nose: Nose normal.   Eyes:      General: No scleral icterus. Right eye: No discharge. Left eye: No discharge. Extraocular Movements: Extraocular movements intact. Conjunctiva/sclera: Conjunctivae normal.   Neck:      Musculoskeletal: Normal range of motion. Pulmonary:      Effort: Pulmonary effort is normal. No respiratory distress. Neurological:      Mental Status: Mental status is at baseline.    Psychiatric: Mood and Affect: Mood normal.         Behavior: Behavior normal.         Thought Content: Thought content normal.         Judgment: Judgment normal.       Assessment & Plan:      ICD-10-CM ICD-9-CM    1. Screening for thyroid disorder  Z13.29 V77.0 TSH 3RD GENERATION   2. Screening, lipid  Z13.220 V77.91 LIPID PANEL      METABOLIC PANEL, COMPREHENSIVE   3. Screening for diabetes mellitus  Z13.1 V77.1 HEMOGLOBIN A1C WITH EAG   4. Screening for deficiency anemia  Z13.0 V78.1 CBC W/O DIFF   5. Folliculitis  U50.5 583.0 mupirocin (BACTROBAN) 2 % ointment   6. Tinea cruris  B35.6 110.3 miconazole (MICOTIN) 2 % topical cream   7. Chronic idiopathic constipation  K59.04 564.00 polyethylene glycol (MIRALAX) 17 gram packet      DISCONTINUED: docusate sodium (DOK) 250 mg capsule   8. Gastroesophageal reflux disease without esophagitis  K21.9 530.81 omeprazole (PRILOSEC) 40 mg capsule   9. Balanitis  N48.1 607.1    10. Candidal balanitis  B37.42 112.2 REFERRAL TO UROLOGY   11. ADHD, impulsive type  F90.1 314.01    12. Developmental delay  R62.50 783.40    13. Autism  F84.0 299.00    14. Moderate persistent asthma without complication  H97.03 917.72        · Candidal Balanitis: Chronic, uncontrolled. referral to urology.  All other conditions listed above: chronic and stable. Will continue current treatment regimen. Advised patient's mother to discuss options for medications for blood draw with patient's psychiatrist. Discussed following up with specialist as scheduled. Discussed recommendations for diet and exercise. I was in the office while conducting this encounter. Consent:  He and/or his healthcare decision maker is aware that this patient-initiated Telehealth encounter is a billable service, with coverage as determined by his insurance carrier. He is aware that he may receive a bill and has provided verbal consent to proceed: Yes    This virtual visit was conducted via [address]  Pursuant to the emergency declaration under the [facility], [facility] waiver authority and the [employer] and [employer] Act, this Virtual  Visit was conducted to reduce the patient's risk of exposure to COVID-19 and provide continuity of care for an established patient. Services were provided through a video synchronous discussion virtually to substitute for in-person clinic visit. Due to this being a TeleHealth evaluation, many elements of the physical examination are unable to be assessed. Total Time: minutes: 21-30 minutes. I have discussed the diagnosis with the patient and the intended plan as seen in the above orders. The patient has received an after-visit summary along with patient information handout. I have discussed medication side effects and warnings with the patient as well. Disposition  Follow-up and Dispositions    · Return in about 6 months (around 5/22/2022).            Tien Mayfield MD

## 2021-11-22 NOTE — TELEPHONE ENCOUNTER
Brennen De Jesus called and said she will fax Patient's OT and Speech plan of care. Faxed received and placed in 's mailbox.

## 2021-11-23 RX ORDER — DOCUSATE SODIUM 100 MG/1
100 CAPSULE, LIQUID FILLED ORAL
Qty: 180 CAPSULE | Refills: 0 | Status: SHIPPED | OUTPATIENT
Start: 2021-11-23 | End: 2022-08-30 | Stop reason: SDUPTHER

## 2021-11-23 NOTE — TELEPHONE ENCOUNTER
MD Baron Taylor,    [facility] stating the [facility] stool softener 250mg softgel is on backorder. Contacted [facility] and they have the 100mg caps available. Entered for the 100mg twice daily as needed if appropriate. Niru Conroy    Last Visit: VV 11/22/21 MD Baron Taylor  Next Appointment: Not scheduled  Previous Refill Encounter(s): 11/22/21 90 (on backorder)    Requested Prescriptions     Pending Prescriptions Disp Refills    docusate sodium (COLACE) 100 mg capsule 180 Capsule 0     Sig: Take 1 Capsule by mouth two (2) times daily as needed for Constipation.

## 2021-11-24 NOTE — TELEPHONE ENCOUNTER
[facility] called checking on the status of paperwork that they had fax for doctor to sign and fax back.     Requesting a call back    Best call back #[phone]

## 2021-11-26 NOTE — TELEPHONE ENCOUNTER
Chief Complaint   Patient presents with    Other     OT AND SPEECH FORMS COMPLETED. Plan of Care reviewed and signed by Dr. Jackie Ace. Form faxed to [facility] at [phone] with confirmation received.

## 2021-11-28 PROBLEM — K21.9 GASTROESOPHAGEAL REFLUX DISEASE WITHOUT ESOPHAGITIS: Status: ACTIVE | Noted: 2021-11-28

## 2021-11-28 PROBLEM — K59.04 CHRONIC IDIOPATHIC CONSTIPATION: Status: ACTIVE | Noted: 2021-11-28

## 2021-11-28 PROBLEM — B37.42 CANDIDAL BALANITIS: Status: ACTIVE | Noted: 2021-11-28

## 2021-12-13 ENCOUNTER — TELEPHONE (OUTPATIENT)
Dept: FAMILY MEDICINE CLINIC | Age: 21
End: 2021-12-13

## 2021-12-13 NOTE — TELEPHONE ENCOUNTER
Chief Complaint   Patient presents with    Other     Requesting list of dentist for autisc individuals. Reviewed for some dentist that assist with autism. [facility]  Phone Number: [phone]    [address]  Phone Number: [phone]    [address] [facility]  Phone Number: [phone]    [facility]  Phone Number: [phone]    A letter was formed and mailed to patient also left an voice message with Ms. Gigi Alvarenga if would like to have names now to contact office.   Nik Gilliland LPN

## 2021-12-13 NOTE — TELEPHONE ENCOUNTER
----- Message from Radha Villavicencio sent at 12/10/2021  3:58 PM EST -----  Subject: Message to Provider    QUESTIONS  Information for Provider? Patient's mother Miranda Cordoba requesting callback   from PCP 181Rashad Jauregui in regards to listing of dentists for   son with Autism   ---------------------------------------------------------------------------  --------------  [address]  What is the best way for the office to contact you? OK to leave message on   voicemail  Preferred Call Back Phone Number?  [phone]  ---------------------------------------------------------------------------  --------------  SCRIPT ANSWERS  undefined

## 2021-12-22 ENCOUNTER — TELEPHONE (OUTPATIENT)
Dept: FAMILY MEDICINE CLINIC | Age: 21
End: 2021-12-22

## 2021-12-22 NOTE — TELEPHONE ENCOUNTER
----- Message from [facility] sent at 12/16/2021  6:58 PM EST -----  Subject: Message to Provider    QUESTIONS  Information for Provider? patient grandmother says she is waiting for some   important paperwork. She would like for someone to follow up with her.   ---------------------------------------------------------------------------  --------------  [address]  What is the best way for the office to contact you? OK to leave message on   voicemail  Preferred Call Back Phone Number? [phone]  ---------------------------------------------------------------------------  --------------  SCRIPT ANSWERS  Relationship to Patient?  Third Party  Representative Name? Riccardo Garibay

## 2021-12-22 NOTE — TELEPHONE ENCOUNTER
Chief Complaint   Patient presents with    Form Completion     Paperwork ( Recommendations for dentists that care for patients with autism ). Spoke with Ms. Gant ( 2 identifiers confirmed ). Ms. Brian Jaime was informed that a letter was sent of dentist who see autistic patients. She informed me she has received letter from [facility]. Glenda's envelope ) but has not had the opportunity to review. Luca Ma LPN    Ms. Gant inquired for refill of Singulair, informed that prescription sent in 10/2021 has 3 refills, she would need to contact pharmacy directly herself by using number on bottle or taking bottle to pharmacy, verbalized understanding.   Luca Ma LPN

## 2021-12-29 ENCOUNTER — TELEPHONE (OUTPATIENT)
Dept: FAMILY MEDICINE CLINIC | Age: 21
End: 2021-12-29

## 2021-12-29 NOTE — TELEPHONE ENCOUNTER
Called, spoke to grandmother   Two pt identifiers confirmed. [address] mom stated that daughter and she did not want to take him to ER or [facility] . Appt was schedule in office. Also writer asked if he has done anything different and he is not. That cetirizine was changed to liquid 2 months ago. Writer encouraged Grandma to call pharmacy to update on patient status. Also informed patient that message will be sent to PCP. Grandma  stated that bumps noted under neck when he scratches. He is having a hard time to sleep. She also stated that he will not let any one draw blood        Please Advise             -Patient stated that bumps noted under neck when he scratches. He is having a hard time to sleep. She also stated that he will not let any one draw blood. ---- Message from Sailaja Spivey sent at 12/29/2021  3:51 PM EST -----  Subject: Message to Provider    QUESTIONS  Information for Provider? Pts Rosalva Monicanajma is calling in. States   pt has been itching around neck and back and she believes it is allergies. She wants to know if PCP Tina Morgan can call something in for that. Please advise.   ---------------------------------------------------------------------------  --------------  CALL BACK INFO  What is the best way for the office to contact you? OK to leave message on   voicemail  Preferred Call Back Phone Number? [phone]  ---------------------------------------------------------------------------  --------------  SCRIPT ANSWERS  Relationship to Patient? Other  Representative Name? Sherice Poll  Is the [employer] on the appropriate HIPAA document in Epic?  Yes

## 2021-12-30 NOTE — TELEPHONE ENCOUNTER
Called, spoke to pt.grand mother  and updated patient on MD recommendation. Two pt identifiers confirmed. Phone Number to Cristobal Fletcher given. She stated she will call to see if his insurance will pay for it. Nelson Vitale verbalized understanding of information discussed w/ no further questions at this time.

## 2021-12-30 NOTE — TELEPHONE ENCOUNTER
Please advise dispatch health vs. Urgent care for further evaluation until scheduled apt on 1/10/2022

## 2022-01-18 ENCOUNTER — TELEPHONE (OUTPATIENT)
Dept: FAMILY MEDICINE CLINIC | Age: 22
End: 2022-01-18

## 2022-01-19 NOTE — TELEPHONE ENCOUNTER
Called, left  for pt to return call to office. Message left concerning form. Elisa Pennington is requesting the form for this patient that was dropped by britney 5 days ago. Has this been address already.

## 2022-01-20 NOTE — TELEPHONE ENCOUNTER
Called, left vm for pt to return call to office. Message left for Grandmother to tell how to get form to her.

## 2022-02-07 DIAGNOSIS — K59.04 CHRONIC IDIOPATHIC CONSTIPATION: ICD-10-CM

## 2022-02-07 RX ORDER — POLYETHYLENE GLYCOL 3350 17 G/17G
17 POWDER, FOR SOLUTION ORAL
Qty: 5 PACKET | Refills: 1 | Status: SHIPPED | OUTPATIENT
Start: 2022-02-07 | End: 2022-08-09 | Stop reason: SDUPTHER

## 2022-02-07 NOTE — TELEPHONE ENCOUNTER
Last Visit: VV 11/22/21 MD Elma Carr  Next Appointment: 2/16/22 MD Elma Carr  Previous Refill Encounter(s): 11/22/21 5 + 1    Requested Prescriptions     Pending Prescriptions Disp Refills    polyethylene glycol (MIRALAX) 17 gram packet 5 Packet 1     Sig: Take 1 Packet by mouth daily as needed for Constipation.

## 2022-02-10 ENCOUNTER — TELEPHONE (OUTPATIENT)
Dept: FAMILY MEDICINE CLINIC | Age: 22
End: 2022-02-10

## 2022-02-10 NOTE — TELEPHONE ENCOUNTER
----- Message from Cherylynn Mcardle sent at 2/7/2022  2:19 PM EST -----  Subject: Message to Provider    QUESTIONS  Information for Provider? PT mother is calling in asking if a nurse could   give a call back in regards to his skin pealing on his ears   ---------------------------------------------------------------------------  --------------  CALL BACK INFO  What is the best way for the office to contact you? OK to leave message on   voicemail  Preferred Call Back Phone Number? [phone]  ---------------------------------------------------------------------------  --------------  SCRIPT ANSWERS  Relationship to Patient?  Self

## 2022-02-10 NOTE — TELEPHONE ENCOUNTER
Chief Complaint   Patient presents with    Skin Problem     skin peeling on ears      Patient's grandmother was left an voice message to contact office.   George Johns LPN

## 2022-02-16 ENCOUNTER — OFFICE VISIT (OUTPATIENT)
Dept: FAMILY MEDICINE CLINIC | Age: 22
End: 2022-02-16
Payer: MEDICAID

## 2022-02-16 VITALS
HEIGHT: 71 IN | WEIGHT: 184 LBS | HEART RATE: 102 BPM | RESPIRATION RATE: 16 BRPM | BODY MASS INDEX: 25.76 KG/M2 | SYSTOLIC BLOOD PRESSURE: 104 MMHG | OXYGEN SATURATION: 98 % | TEMPERATURE: 97.6 F | DIASTOLIC BLOOD PRESSURE: 70 MMHG

## 2022-02-16 DIAGNOSIS — F90.1 ADHD, IMPULSIVE TYPE: ICD-10-CM

## 2022-02-16 DIAGNOSIS — J45.40 MODERATE PERSISTENT ASTHMA WITHOUT COMPLICATION: ICD-10-CM

## 2022-02-16 DIAGNOSIS — K59.04 CHRONIC IDIOPATHIC CONSTIPATION: ICD-10-CM

## 2022-02-16 DIAGNOSIS — L21.9 SEBORRHEIC DERMATITIS: ICD-10-CM

## 2022-02-16 DIAGNOSIS — N39.41 URGE INCONTINENCE: ICD-10-CM

## 2022-02-16 DIAGNOSIS — B37.42 CANDIDAL BALANITIS: ICD-10-CM

## 2022-02-16 DIAGNOSIS — K21.9 GASTROESOPHAGEAL REFLUX DISEASE WITHOUT ESOPHAGITIS: ICD-10-CM

## 2022-02-16 DIAGNOSIS — F84.0 AUTISM: ICD-10-CM

## 2022-02-16 DIAGNOSIS — R39.81 FUNCTIONAL INCONTINENCE: ICD-10-CM

## 2022-02-16 DIAGNOSIS — H92.01 RIGHT EAR PAIN: Primary | ICD-10-CM

## 2022-02-16 DIAGNOSIS — R62.50 DEVELOPMENTAL DELAY: ICD-10-CM

## 2022-02-16 PROCEDURE — 99213 OFFICE O/P EST LOW 20 MIN: CPT | Performed by: FAMILY MEDICINE

## 2022-02-16 RX ORDER — OFLOXACIN 3 MG/ML
10 SOLUTION AURICULAR (OTIC) DAILY
Qty: 5 ML | Refills: 0 | Status: SHIPPED | OUTPATIENT
Start: 2022-02-16 | End: 2022-02-23

## 2022-02-16 NOTE — PROGRESS NOTES
Marquis Boggs  22 y.o. male  2000  [address] [place] [id number]  [id number]     TREVA Lind 54       Encounter Date: 2/16/2022           Established Patient Visit Note: Mary Gill MD    Reason for Appointment:  Chief Complaint   Patient presents with    Skin Problem         History of Present Illness:  History provided by patient    Nivia Santiago is a 22 y.o. male who presents to clinic today for:     · Ear Pain: duration of about 2 weeks. His caregiver reports that someone may have cleaned his ear out recently. · Seborrheic Dermatitis: [place] [facility]  · Constipation, GERD:apt with GI (Dr. Francesca Gilbert) scheduled for 3/22/22. His caregivers reports that this has been about the same. · Asthma: Using Symbicort as prescribed. He is followed by asthma specialist (Dr. Jameson Yan). Last asthma attack was last Sunday. He uses rescue inhaler about two times per week. · Candidal Balanitis:  Patient previously followed by peds urology. He was referra to  Urology in Nov, 2022, but his family has not yet scheduled this visit. His caregiver reports that it has been okay recently. · MRSA on Back: uses mupirocin  · Autism/ADHD/DD: followed by psychiatry (Dr. Amie Christensen)    HM  Caregiver reports that he will not tolerate any shots or allow for blood draw. Review of Systems  Review of Systems   Unable to perform ROS: Mental acuity          Allergies: Pcn [penicillins] and Milk    Medications:     Current Outpatient Medications:     ofloxacin (FLOXIN) 0.3 % otic solution, Administer 10 Drops in right ear daily for 7 days. , Disp: 5 mL, Rfl: 0    polyethylene glycol (MIRALAX) 17 gram packet, Take 1 Packet by mouth daily as needed for Constipation. , Disp: 5 Packet, Rfl: 1    docusate sodium (COLACE) 100 mg capsule, Take 1 Capsule by mouth two (2) times daily as needed for Constipation. , Disp: 180 Capsule, Rfl: 0    mupirocin (BACTROBAN) 2 % ointment, Apply  to affected area two (2) times a day., Disp: 22 g, Rfl: 1    miconazole (MICOTIN) 2 % topical cream, Apply  to affected area two (2) times a day., Disp: 15 g, Rfl: 1    omeprazole (PRILOSEC) 40 mg capsule, Take 1 Capsule by mouth daily. , Disp: 90 Capsule, Rfl: 1    montelukast (SINGULAIR) 10 mg tablet, TAKE 1 TABLET BY MOUTH EVERY DAY for allergies and asthma, Disp: 90 Tablet, Rfl: 3    melatonin 10 mg capsule, Take 1 Capsule by mouth nightly., Disp: 90 Capsule, Rfl: 3    budesonide-formoteroL (SYMBICORT) 160-4.5 mcg/actuation HFAA, Take 2 Puffs by inhalation two (2) times a day., Disp: 1 Inhaler, Rfl: 5    albuterol (PROVENTIL HFA, VENTOLIN HFA, PROAIR HFA) 90 mcg/actuation inhaler, Take 2-4 Puffs by inhalation every four (4) hours as needed for Wheezing or Shortness of Breath., Disp: 1 Inhaler, Rfl: 3    albuterol (PROVENTIL VENTOLIN) 2.5 mg /3 mL (0.083 %) nebu, Use 3 ml via nebulizer every 4 hours as needed for wheezing or cough, Disp: 150 mL, Rfl: 1    mineral oil-hydrophil petrolat (AQUAPHOR) ointment, Apply  to affected area as needed for Dry Skin. Please dispense Aquaphor lip repair for patient, Disp: 15 g, Rfl: prn    ketoconazole (NIZORAL) 2 % topical cream, Apply  to affected area two (2) times a day., Disp: 60 g, Rfl: 2    cloNIDine HCL (CATAPRES) 0.1 mg tablet, Take 1 Tab by mouth nightly., Disp: 90 Tab, Rfl: 1    naproxen (NAPROSYN) 500 mg tablet, Take 1 Tab by mouth two (2) times daily as needed for Headache., Disp: 30 Tab, Rfl: 1    cetirizine (ZYRTEC) 10 mg tablet, Take 1 Tab by mouth daily. For allergies, Disp: 90 Tab, Rfl: 3    Ibuprofen-diphenhydrAMINE (ADVIL PM) 200-38 mg tab, Take 1 Tab by mouth nightly. (Patient taking differently: Take 1 Tab by mouth as needed.), Disp: 30 Tab, Rfl: 1    triamcinolone acetonide (KENALOG) 0.1 % topical cream, Apply  to affected area two (2) times a day.  use thin layer, Disp: 454 g, Rfl: 0    glycerin-mineral oil-lanolin (EUCERIN PLUS) topical cream, Apply  to affected area as needed for Dry Skin., Disp: 113 g, Rfl: prn    benztropine (COGENTIN) 0.5 mg tablet, Take 0.5 mg by mouth two (2) times a day., Disp: , Rfl:     hydrOXYzine HCl (ATARAX) 10 mg tablet, Take  by mouth three (3) times daily as needed for Itching., Disp: , Rfl:     risperiDONE (RISPERDAL) 1 mg tablet, Take 1 mg by mouth three (3) times daily. , Disp: , Rfl:     amphetamine-dextroamphetamine (ADDERALL) 15 mg tablet, Take 15 mg by mouth., Disp: , Rfl:     History  Patient Care Team:  Arnie Dakin, MD as PCP - General (Family Medicine)  Arnie Dakin, MD as PCP - [facility] Provider  Mia Morotn MD as Physician (Pediatric Neurology)  Demi Reyes MD (Allergy)    Past Medical History: he has a past medical history of ADHD, impulsive type, Asthma, Autism, Constipation, Developmental delay, Headache, Insomnia, ODD (oppositional defiant disorder), Sleep apnea, and Weight loss (07/2018). Past Surgical History: he has a past surgical history that includes hx tonsil and adenoidectomy. Family Medical History: family history includes Asthma in his father, maternal grandmother, mother, and sister; Cancer in his maternal grandfather; Headache in his maternal grandmother; Migraines in his mother; Thyroid Cancer in his mother; Thyroid Disease in his mother. Social History: he reports that he has never smoked. He has never used smokeless tobacco. He reports that he does not drink alcohol and does not use drugs. Objective:   Visit Vitals  /70   Pulse (!) 102   Temp 97.6 °F (36.4 °C)   Resp 16   Ht 5' 11\" (1.803 m)   Wt 184 lb (83.5 kg)   SpO2 98%   BMI 25.66 kg/m²     Wt Readings from Last 3 Encounters:   02/16/22 184 lb (83.5 kg)   05/11/21 180 lb (81.6 kg) (80 %, Z= 0.85)*   04/01/21 184 lb (83.5 kg) (84 %, Z= 0.98)*     * Growth percentiles are based on CDC (Boys, 2-20 Years) data.        Physical Exam  Constitutional:       Appearance: Normal appearance. HENT:      Head: Normocephalic and atraumatic. Left Ear: External ear normal.      Ears:      Comments: Right ear canal erythematous     Nose: Nose normal.      Mouth/Throat:      Pharynx: No oropharyngeal exudate. Eyes:      General: No scleral icterus. Right eye: No discharge. Left eye: No discharge. Conjunctiva/sclera: Conjunctivae normal.      Pupils: Pupils are equal, round, and reactive to light. Neck:      Thyroid: No thyromegaly. Vascular: No JVD. Trachea: No tracheal deviation. Cardiovascular:      Rate and Rhythm: Normal rate and regular rhythm. Heart sounds: Normal heart sounds. No murmur heard. No friction rub. No gallop. Pulmonary:      Effort: Pulmonary effort is normal. No respiratory distress. Breath sounds: Normal breath sounds. No stridor. No wheezing. Musculoskeletal:         General: No tenderness or deformity. Normal range of motion. Cervical back: Normal range of motion and neck supple. Lymphadenopathy:      Cervical: No cervical adenopathy. Skin:     General: Skin is warm and dry. Neurological:      Mental Status: He is alert. Cranial Nerves: No cranial nerve deficit. Coordination: Coordination normal.      Gait: Gait is intact. Deep Tendon Reflexes: Reflexes normal.             Assessment & Plan:      ICD-10-CM ICD-9-CM    1. Right ear pain  H92.01 388.70 REFERRAL TO ENT-OTOLARYNGOLOGY      ofloxacin (FLOXIN) 0.3 % otic solution   2. Seborrheic dermatitis  L21.9 690.10    3. Chronic idiopathic constipation  K59.04 564.00    4. Gastroesophageal reflux disease without esophagitis  K21.9 530.81    5. Moderate persistent asthma without complication  K38.04 905.94    6. Candidal balanitis  B37.42 112.2    7. ADHD, impulsive type  F90.1 314.01    8. Developmental delay  R62.50 783.40    9. Autism  F84.0 299.00      · Right Ear Pain: symptoms concerning of EOM.  Will treat with floxin and provide referral to ENT  · Candidal balanitis: Chronic, stable. Reprinted referral to urology and advised caregivers to schedule apt.  All other conditions listed above: Chronic, stable and/or managed by specialist. Will continue current treatment regimen. Discussed following up with specialist as scheduled. Discussed recommendations for diet and exercise. I have discussed the diagnosis with the patient and the intended plan as seen in the above orders. The patient has received an after-visit summary along with patient information handout. I have discussed medication side effects and warnings with the patient as well. Disposition  Follow-up and Dispositions    · Return in about 3 months (around 5/16/2022) for annual physical.           Marciano Huff MD     ------------------------------------------------------------------------------------------------------------------    Addendum  05/12/22  2:34 PM    Incontinence: Functional, Urge  Underlying Condition: developmental delay (R62.50)  Degree of incontinence is moderate and frequent      ICD-10-CM ICD-9-CM    10. Functional incontinence  R39.81 788.91    11.  Urge incontinence  N39.41 788.31          Marciano Huff MD

## 2022-02-16 NOTE — PROGRESS NOTES
Chief Complaint   Patient presents with    Skin Problem        1. \"Have you been to the ER, urgent care clinic since your last visit? Hospitalized since your last visit? \" No    2. \"Have you seen or consulted any other health care providers outside of the [address] [place] [address] since your last visit? \" No     3. For patients aged 39-70: Has the patient had a colonoscopy? NA - based on age     If the patient is female:    4. For patients aged 41-77: Has the patient had a mammogram within the past 2 years? NA - based on age    11. For patients aged 21-30: Has the patient had a pap smear?  NA - based on age    1 most recent PHQ Screens 2/16/2022   PHQ Not Done -   Little interest or pleasure in doing things Not at all   Feeling down, depressed, irritable, or hopeless Not at all   Total Score PHQ 2 0

## 2022-02-17 NOTE — TELEPHONE ENCOUNTER
Chief Complaint   Patient presents with    Skin Problem     skin peeling on ears      Patient seen on 2/16/2022 for ear problem.   Valerie Krishnamurthy LPN

## 2022-02-25 ENCOUNTER — TELEPHONE (OUTPATIENT)
Dept: FAMILY MEDICINE CLINIC | Age: 22
End: 2022-02-25

## 2022-03-14 ENCOUNTER — TELEPHONE (OUTPATIENT)
Dept: FAMILY MEDICINE CLINIC | Age: 22
End: 2022-03-14

## 2022-03-14 DIAGNOSIS — K59.04 CHRONIC IDIOPATHIC CONSTIPATION: Primary | ICD-10-CM

## 2022-03-14 NOTE — TELEPHONE ENCOUNTER
----- Message from Stephie Boss sent at 3/7/2022 10:20 AM EST -----  Subject: Referral Request    QUESTIONS   Reason for referral request? Patient's grandma needs information for her   grandson's gastrology referral. Please get back with guardian. Has the physician seen you for this condition before? No   Preferred Specialist (if applicable)? Do you already have an appointment scheduled? Additional Information for Provider?   ---------------------------------------------------------------------------  --------------  CALL BACK INFO  What is the best way for the office to contact you? OK to leave message on   voicemail  Preferred Call Back Phone Number?  [phone]

## 2022-03-15 NOTE — TELEPHONE ENCOUNTER
Called, spoke to pt. Sana De Jesus   Two pt identifiers confirmed. She is requesting a GI Referral . She stated you and her discussed doing last Visit. She also state that it is hard for her to get him here. Please Advise.

## 2022-03-16 NOTE — TELEPHONE ENCOUNTER
Called, spoke to pt. [facility]   Two pt identifiers confirmed. Writer informed her of Referral information  Given and also will mail. Pt verbalized understanding of information discussed w/ no further questions at this time.    John Santiago LPN

## 2022-03-18 PROBLEM — J20.9 BRONCHITIS, SUBACUTE: Status: ACTIVE | Noted: 2021-07-13

## 2022-03-18 PROBLEM — F51.01 PRIMARY INSOMNIA: Status: ACTIVE | Noted: 2021-07-13

## 2022-03-18 PROBLEM — B35.6 TINEA CRURIS: Status: ACTIVE | Noted: 2021-07-13

## 2022-03-18 PROBLEM — K59.04 CHRONIC IDIOPATHIC CONSTIPATION: Status: ACTIVE | Noted: 2021-11-28

## 2022-03-18 PROBLEM — F40.231 SEVERE NEEDLE PHOBIA: Status: ACTIVE | Noted: 2021-07-13

## 2022-03-19 PROBLEM — K21.9 GASTROESOPHAGEAL REFLUX DISEASE WITHOUT ESOPHAGITIS: Status: ACTIVE | Noted: 2021-11-28

## 2022-03-20 PROBLEM — B37.42 CANDIDAL BALANITIS: Status: ACTIVE | Noted: 2021-11-28

## 2022-03-20 PROBLEM — L73.9 FOLLICULITIS: Status: ACTIVE | Noted: 2021-07-13

## 2022-04-06 RX ORDER — PROMETHAZINE HYDROCHLORIDE AND DEXTROMETHORPHAN HYDROBROMIDE 6.25; 15 MG/5ML; MG/5ML
5 SYRUP ORAL
Qty: 120 ML | Refills: 0 | Status: SHIPPED | OUTPATIENT
Start: 2022-04-06 | End: 2022-04-13

## 2022-04-06 NOTE — TELEPHONE ENCOUNTER
MD Saurabh Ernandez,    Call via call center for new rx for Promethazine DM to [facility].  Not on current med list.  Hx of med 10/14/20 by NP Nikolai Escalera. Entered new rx as previously prescribed if appropriate. Niru Conroy    Subject: Refill Request     QUESTIONS   Name of Medication? Other - promethazine-dextromethorphan   (PROMETHAZINE-DM) 6.25-15 mg/5 mL syrup   Patient-reported dosage and instructions? Take 5 mL by mouth four (4)   times daily as needed for Cough for up to 7 days   How many days do you have left? 0   Preferred Pharmacy? [facility]/PHARMACY #[id number]   Pharmacy phone number (if available)? [phone]     Last Visit: 2/16/22 MD Saurabh Ernandez  Next Appointment: Not scheduled  Previous Refill Encounter(s): 10/14/20 120ml (not on current med list- HX- x 7 days)    Requested Prescriptions     Pending Prescriptions Disp Refills    promethazine-dextromethorphan (PROMETHAZINE-DM) 6.25-15 mg/5 mL syrup 120 mL 0     Sig: Take 5 mL by mouth every four (4) hours as needed for Cough for up to 7 days.

## 2022-04-18 DIAGNOSIS — B35.6 TINEA CRURIS: ICD-10-CM

## 2022-04-18 RX ORDER — DOXYLAMINE SUCCINATE 25 MG
TABLET ORAL 2 TIMES DAILY
Qty: 15 G | Refills: 1 | Status: SHIPPED | OUTPATIENT
Start: 2022-04-18

## 2022-04-18 NOTE — TELEPHONE ENCOUNTER
Last Visit: 2/16/22 MD Seda Liu  Next Appointment: None  Previous Refill Encounter(s): 11/22/21 15g + 1    Requested Prescriptions     Pending Prescriptions Disp Refills    miconazole (MICOTIN) 2 % topical cream 15 g 1     Sig: Apply  to affected area two (2) times a day.

## 2022-05-09 ENCOUNTER — TELEPHONE (OUTPATIENT)
Dept: FAMILY MEDICINE CLINIC | Age: 22
End: 2022-05-09

## 2022-05-09 NOTE — TELEPHONE ENCOUNTER
Called, spoke to pt. [address] mother  Two pt identifiers confirmed.    Writer called and schedule an appointment for physical. Physical schedule 5/20/22 at 3:20pm

## 2022-06-07 ENCOUNTER — TELEPHONE (OUTPATIENT)
Dept: FAMILY MEDICINE CLINIC | Age: 22
End: 2022-06-07

## 2022-06-07 NOTE — TELEPHONE ENCOUNTER
Called, spoke to Felicia Guzman Two pt identifiers confirmed. Paperwork faxed and confirmed.  Edna Sunshine LPN

## 2022-06-07 NOTE — TELEPHONE ENCOUNTER
Called, left vm for pt to return call to office. Message left that writer have paper work and appt not until 6/24/22 . Also that paper work will take 7 to 10 Business days to be completed.  It will be placed on MD desk and message will be routed to MD.

## 2022-06-09 ENCOUNTER — TELEPHONE (OUTPATIENT)
Dept: FAMILY MEDICINE CLINIC | Age: 22
End: 2022-06-09

## 2022-06-09 DIAGNOSIS — F84.0 AUTISM: ICD-10-CM

## 2022-06-09 DIAGNOSIS — F90.1 ADHD, IMPULSIVE TYPE: Primary | ICD-10-CM

## 2022-06-09 DIAGNOSIS — R62.50 DEVELOPMENTAL DELAY: ICD-10-CM

## 2022-06-09 NOTE — TELEPHONE ENCOUNTER
Called, spoke to pt. [address] mother  Two pt identifiers confirmed. Writer gave her the information for Neurology appointment She verbalized understanding of information discussed w/ no further questions at this time.    Luis Miguel Aguillon LPN

## 2022-06-09 NOTE — TELEPHONE ENCOUNTER
Placed referral to neurology as requested. Will discuss further at scheduled follow up visit.      Maria Alejandra Stevenson MD

## 2022-06-09 NOTE — TELEPHONE ENCOUNTER
----- Message from Byron Person sent at 6/9/2022 10:38 AM EDT -----  Subject: Message to Provider    QUESTIONS  Information for Provider? Pts grandmother Hank Radford is requesting nurse or   PCP call her asap. Please advise.   ---------------------------------------------------------------------------  --------------  CALL BACK INFO  What is the best way for the office to contact you? OK to leave message on   voicemail  Preferred Call Back Phone Number? [phone]  ---------------------------------------------------------------------------  --------------  SCRIPT ANSWERS  Relationship to Patient? Other  Representative Name? Lidia Auguste  Is the [facility] Life on the appropriate HIPAA document in Epic?  Yes

## 2022-06-09 NOTE — TELEPHONE ENCOUNTER
----- Message from Josy Abreu sent at 6/8/2022  4:11 PM EDT -----  Subject: Message to Provider    QUESTIONS  Information for Provider? Jefferson Patterson called today to give an alternative   number to call regarding the referral for an Adult Neurologist. Secondary   contact [address] [place] [address] ([facility] [phone].  ---------------------------------------------------------------------------  --------------  Christa MATTA  What is the best way for the office to contact you? OK to leave message on   voicemail  Preferred Call Back Phone Number? [phone]  ---------------------------------------------------------------------------  --------------  SCRIPT ANSWERS  Relationship to Patient? Guardian  Representative Name? Ronny De  Is the Representative on the appropriate HIPAA document in Epic?  Yes

## 2022-06-09 NOTE — TELEPHONE ENCOUNTER
Called, spoke to pt.(grandmother) Francisco Jbailee Chou Two pt identifiers confirmed. She stated that patient is throwing things, not eating, not sleeping and also OCD not wanting to getting on bus. She is also wants a Neurologist Referral if possible before appointment 6/24. She stated his actions has change drastically. She states that the weather has cause him to act out also. Dr. Juan Ling : PSY . He stated that he cannot increase his medication Adderall and Risperdal,  because he is on the highest that he can give him.

## 2022-06-24 NOTE — TELEPHONE ENCOUNTER
Please let caregiver know that I was not able to identify a dentist that specializes in Autistic individuals. Prediabetes - new

## 2022-07-15 ENCOUNTER — OFFICE VISIT (OUTPATIENT)
Dept: FAMILY MEDICINE CLINIC | Age: 22
End: 2022-07-15
Payer: MEDICAID

## 2022-07-15 VITALS
DIASTOLIC BLOOD PRESSURE: 86 MMHG | BODY MASS INDEX: 24.5 KG/M2 | HEART RATE: 78 BPM | HEIGHT: 71 IN | TEMPERATURE: 97.4 F | SYSTOLIC BLOOD PRESSURE: 110 MMHG | RESPIRATION RATE: 16 BRPM | WEIGHT: 175 LBS

## 2022-07-15 DIAGNOSIS — R51.9 NONINTRACTABLE HEADACHE, UNSPECIFIED CHRONICITY PATTERN, UNSPECIFIED HEADACHE TYPE: ICD-10-CM

## 2022-07-15 DIAGNOSIS — L21.9 SEBORRHEIC DERMATITIS: ICD-10-CM

## 2022-07-15 DIAGNOSIS — G47.00 INSOMNIA, UNSPECIFIED TYPE: Primary | ICD-10-CM

## 2022-07-15 DIAGNOSIS — R62.50 DEVELOPMENTAL DELAY: ICD-10-CM

## 2022-07-15 DIAGNOSIS — B37.42 CANDIDAL BALANITIS: ICD-10-CM

## 2022-07-15 DIAGNOSIS — F90.1 ADHD, IMPULSIVE TYPE: ICD-10-CM

## 2022-07-15 DIAGNOSIS — F84.0 AUTISM: ICD-10-CM

## 2022-07-15 DIAGNOSIS — J45.40 MODERATE PERSISTENT ASTHMA WITHOUT COMPLICATION: ICD-10-CM

## 2022-07-15 PROCEDURE — 99213 OFFICE O/P EST LOW 20 MIN: CPT | Performed by: FAMILY MEDICINE

## 2022-07-15 NOTE — PROGRESS NOTES
Marquis Boggs  25 y.o. male  2000  [address] [place] [address]  [id number]     [address]       Encounter Date: 7/15/2022           Established Patient Visit Note: John Gill MD    Reason for Appointment:  Chief Complaint   Patient presents with    Follow-up         History of Present Illness:  History provided by patient, caregiver    Reji Stanley is a 25 y.o. male who presents to clinic today for:       His caregiver reports that she is concerned about OCD. Headache:  She also reports that sometimes he will hold his head, and she is concerned that he might be having a migraine. She reports that he aged out from seeing his neurologist, and they would like a referral to a new neurologist. They will give him Ibuprofen, but she is not sure if this helps. She reports that they would like a different sleep doctor. She reports that he has been scratching around his neck a little more. She is concerned that it may be allergies. He is taking allegra and Singulair. They have tried the Atarax, but she reports that this did not seem to help. Ear Pain: his mother reports that they saw ENT who did not identify any concerns  Seborrheic Dermatitis: followed by [facility]; they have been trying to schedule another apt. Constipation, GERD: followed by GI (Dr. Suzanna Gautam)  with last visit on 3/22/22. Omeprazole was decreased from 40mg to 20mg  Asthma: Using Symbicort as prescribed. He is followed by asthma specialist (Dr. Lita Batres). He uses rescue inhaler about three times per month. Candidal Balanitis:  his mother reports that he saw urology who recommended continuing the cream. A circumcision was recommended but family declined. Autism/ADHD/DD: followed by psychiatry (Dr. Anny Monet)     HM  Caregiver reports that he will not tolerate any shots or allow for blood draw.            Review of Systems  All other ROS were reviewed and are negative except as discussed in HPI        Allergies: Pcn [penicillins] and Milk    Medications:     Current Outpatient Medications:     miconazole (MICOTIN) 2 % topical cream, Apply  to affected area two (2) times a day., Disp: 15 g, Rfl: 1    polyethylene glycol (MIRALAX) 17 gram packet, Take 1 Packet by mouth daily as needed for Constipation. , Disp: 5 Packet, Rfl: 1    docusate sodium (COLACE) 100 mg capsule, Take 1 Capsule by mouth two (2) times daily as needed for Constipation. , Disp: 180 Capsule, Rfl: 0    montelukast (SINGULAIR) 10 mg tablet, TAKE 1 TABLET BY MOUTH EVERY DAY for allergies and asthma, Disp: 90 Tablet, Rfl: 3    melatonin 10 mg capsule, Take 1 Capsule by mouth nightly., Disp: 90 Capsule, Rfl: 3    budesonide-formoteroL (SYMBICORT) 160-4.5 mcg/actuation HFAA, Take 2 Puffs by inhalation two (2) times a day., Disp: 1 Inhaler, Rfl: 5    albuterol (PROVENTIL HFA, VENTOLIN HFA, PROAIR HFA) 90 mcg/actuation inhaler, Take 2-4 Puffs by inhalation every four (4) hours as needed for Wheezing or Shortness of Breath., Disp: 1 Inhaler, Rfl: 3    albuterol (PROVENTIL VENTOLIN) 2.5 mg /3 mL (0.083 %) nebu, Use 3 ml via nebulizer every 4 hours as needed for wheezing or cough, Disp: 150 mL, Rfl: 1    mineral oil-hydrophil petrolat (AQUAPHOR) ointment, Apply  to affected area as needed for Dry Skin. Please dispense Aquaphor lip repair for patient, Disp: 15 g, Rfl: prn    ketoconazole (NIZORAL) 2 % topical cream, Apply  to affected area two (2) times a day., Disp: 60 g, Rfl: 2    cloNIDine HCL (CATAPRES) 0.1 mg tablet, Take 1 Tab by mouth nightly., Disp: 90 Tab, Rfl: 1    cetirizine (ZYRTEC) 10 mg tablet, Take 1 Tab by mouth daily.  For allergies, Disp: 90 Tab, Rfl: 3    glycerin-mineral oil-lanolin (EUCERIN PLUS) topical cream, Apply  to affected area as needed for Dry Skin., Disp: 113 g, Rfl: prn    benztropine (COGENTIN) 0.5 mg tablet, Take 0.5 mg by mouth two (2) times a day., Disp: , Rfl:     hydrOXYzine HCl (ATARAX) 10 mg tablet, Take  by mouth three (3) times daily as needed for Itching., Disp: , Rfl:     risperiDONE (RISPERDAL) 1 mg tablet, Take 1 mg by mouth three (3) times daily. , Disp: , Rfl:     amphetamine-dextroamphetamine (ADDERALL) 15 mg tablet, Take 15 mg by mouth., Disp: , Rfl:     triamcinolone acetonide (KENALOG) 0.1 % topical cream, Apply  to affected area two (2) times a day. use thin layer, Disp: 454 g, Rfl: 0    History  Patient Care Team:  Claudia Lira MD as PCP - General (Family Medicine)  Claudia Lira MD as PCP - [facility]  Tabitha Martinez MD as Physician (Pediatric Neurology)  Leonel Weldon MD (Allergy)    Past Medical History: he has a past medical history of ADHD, impulsive type, Asthma, Autism, Constipation, Developmental delay, Headache, Insomnia, ODD (oppositional defiant disorder), Sleep apnea, and Weight loss (07/2018). Past Surgical History: he has a past surgical history that includes hx tonsil and adenoidectomy. Family Medical History: family history includes Asthma in his father, maternal grandmother, mother, and sister; Cancer in his maternal grandfather; Headache in his maternal grandmother; Migraines in his mother; Thyroid Cancer in his mother; Thyroid Disease in his mother. Social History: he reports that he has never smoked. He has never used smokeless tobacco. He reports that he does not drink alcohol and does not use drugs. Objective:   Visit Vitals  /86 (BP 1 Location: Left upper arm, BP Patient Position: Sitting, BP Cuff Size: Large adult)   Pulse 78   Temp 97.4 °F (36.3 °C) (Temporal)   Resp 16   Ht 5' 11\" (1.803 m)   Wt 175 lb (79.4 kg)   BMI 24.41 kg/m²     Wt Readings from Last 3 Encounters:   07/15/22 175 lb (79.4 kg)   02/16/22 184 lb (83.5 kg)   05/11/21 180 lb (81.6 kg) (80 %, Z= 0.85)*     * Growth percentiles are based on CDC (Boys, 2-20 Years) data. Physical Exam  Constitutional:       Appearance: Normal appearance.    HENT: Head: Normocephalic and atraumatic. Right Ear: External ear normal.      Left Ear: External ear normal.      Nose: Nose normal.      Mouth/Throat:      Pharynx: No oropharyngeal exudate. Eyes:      General: No scleral icterus. Right eye: No discharge. Left eye: No discharge. Conjunctiva/sclera: Conjunctivae normal.      Pupils: Pupils are equal, round, and reactive to light. Neck:      Thyroid: No thyromegaly. Vascular: No JVD. Trachea: No tracheal deviation. Cardiovascular:      Rate and Rhythm: Normal rate and regular rhythm. Heart sounds: Normal heart sounds. No murmur heard. No friction rub. No gallop. Pulmonary:      Effort: Pulmonary effort is normal. No respiratory distress. Breath sounds: Normal breath sounds. No stridor. No wheezing. Musculoskeletal:         General: No tenderness or deformity. Normal range of motion. Cervical back: Normal range of motion and neck supple. Lymphadenopathy:      Cervical: No cervical adenopathy. Skin:     General: Skin is warm and dry. Neurological:      Mental Status: He is alert. Cranial Nerves: No cranial nerve deficit. Coordination: Coordination normal.      Gait: Gait is intact. Deep Tendon Reflexes: Reflexes normal.       Assessment & Plan:      ICD-10-CM ICD-9-CM    1. Insomnia, unspecified type  G47.00 780.52 SLEEP MEDICINE REFERRAL      2. Nonintractable headache, unspecified chronicity pattern, unspecified headache type  R51.9 784.0 REFERRAL TO NEUROLOGY      3. ADHD, impulsive type  F90.1 314.01       4. Developmental delay  R62.50 783.40       5. Autism  F84.0 299.00       6. Seborrheic dermatitis  L21.9 690.10       7. Moderate persistent asthma without complication  H51.98 081.66       8. Candidal balanitis  B37.42 112.2           Neck Itching: Chronic, uncontrolled. Advised scheduling visit with dermatology  Headaches: Chronic, uncontrolled.  Caregiver reports that he will only tolerate liquid medicaitons. Advised continuing liquid OTC ibuprofen and shceudling visit with neurology  Insomnia: Will provide referral to different sleep medicine specialist as requested by caregiver  All other conditions listed above: Chronic, stable and/or managed by specialist. Will continue current treatment regimen. Discussed following up with specialist as scheduled. Discussed recommendations for diet and exercise. I have discussed the diagnosis with the patient and the intended plan as seen in the above orders. The patient has received an after-visit summary along with patient information handout. I have discussed medication side effects and warnings with the patient as well. Disposition  Follow-up and Dispositions    Return in about 3 months (around 10/15/2022) for follow up of chronic conditions.            Gwen Arredondo MD

## 2022-07-15 NOTE — PROGRESS NOTES
Chief Complaint   Patient presents with    Complete Physical     1. \"Have you been to the ER, urgent care clinic since your last visit? Hospitalized since your last visit? \" no    2. \"Have you seen or consulted any other health care providers outside of the [address] [place] [address] since your last visit? \"  ENT - ear check       3. For patients aged 39-70: Has the patient had a colonoscopy / FIT/ Cologuard? No gap    If the patient is female:    4. For patients aged 41-77: Has the patient had a mammogram within the past 2 years? 5. For patients aged 21-65: Has the patient had a pap smear?

## 2022-08-09 DIAGNOSIS — K59.04 CHRONIC IDIOPATHIC CONSTIPATION: ICD-10-CM

## 2022-08-09 RX ORDER — POLYETHYLENE GLYCOL 3350 17 G/17G
17 POWDER, FOR SOLUTION ORAL
Qty: 5 PACKET | Refills: 1 | Status: SHIPPED | OUTPATIENT
Start: 2022-08-09 | End: 2022-08-30 | Stop reason: SDUPTHER

## 2022-08-09 NOTE — TELEPHONE ENCOUNTER
Last Visit: 7/15/22  [facility]  Next Appointment: None  Previous Refill Encounter(s): 2/7/22 5 + 1    Requested Prescriptions     Pending Prescriptions Disp Refills    polyethylene glycol (MIRALAX) 17 gram packet 5 Packet 1     Sig: Take 1 Packet by mouth daily as needed for Constipation. For Mack Jackson in place:   Recommendation Provided To:    Intervention Detail: New Rx: 1, reason: Patient Preference  Gap Closed?:   Intervention Accepted By:   Time Spent (min): 5

## 2022-08-16 RX ORDER — PROMETHAZINE HYDROCHLORIDE AND DEXTROMETHORPHAN HYDROBROMIDE 6.25; 15 MG/5ML; MG/5ML
5 SYRUP ORAL
Qty: 120 ML | Refills: 0 | OUTPATIENT
Start: 2022-08-16 | End: 2022-08-23

## 2022-08-16 NOTE — TELEPHONE ENCOUNTER
Chief Complaint   Patient presents with    Medication Refill      promethazine-dextromethorphan      Patient seen on 07/15/22.

## 2022-08-17 ENCOUNTER — TELEPHONE (OUTPATIENT)
Dept: FAMILY MEDICINE CLINIC | Age: 22
End: 2022-08-17

## 2022-08-17 NOTE — TELEPHONE ENCOUNTER
Caregiver called for refill of promethazine-dextromethorphan. Please contact patient's caregiver to schedule appt. [phone]      MD Casandra Ramirez is requiring appt for this refill. Request refused: Appt required, please call patient.     Thanks, Mehnaz Stewart

## 2022-08-17 NOTE — TELEPHONE ENCOUNTER
SHANE on 8/17/22 @ 10:12 am to have pt's care giver Pedro Ca) callback the office. Bessy Justice will not fill the pt's medication until the pt has an appt.  does have some availability today.

## 2022-08-30 ENCOUNTER — OFFICE VISIT (OUTPATIENT)
Dept: FAMILY MEDICINE CLINIC | Age: 22
End: 2022-08-30
Payer: MEDICAID

## 2022-08-30 VITALS
BODY MASS INDEX: 24.81 KG/M2 | HEIGHT: 71 IN | DIASTOLIC BLOOD PRESSURE: 72 MMHG | HEART RATE: 100 BPM | SYSTOLIC BLOOD PRESSURE: 116 MMHG | RESPIRATION RATE: 14 BRPM | WEIGHT: 177.2 LBS | TEMPERATURE: 98.6 F | OXYGEN SATURATION: 95 %

## 2022-08-30 DIAGNOSIS — F51.01 PRIMARY INSOMNIA: ICD-10-CM

## 2022-08-30 DIAGNOSIS — K59.04 CHRONIC IDIOPATHIC CONSTIPATION: ICD-10-CM

## 2022-08-30 DIAGNOSIS — F84.0 AUTISM: ICD-10-CM

## 2022-08-30 DIAGNOSIS — K21.9 GASTROESOPHAGEAL REFLUX DISEASE WITHOUT ESOPHAGITIS: ICD-10-CM

## 2022-08-30 DIAGNOSIS — F90.1 ADHD, IMPULSIVE TYPE: ICD-10-CM

## 2022-08-30 DIAGNOSIS — R62.50 DEVELOPMENTAL DELAY: ICD-10-CM

## 2022-08-30 DIAGNOSIS — J45.40 MODERATE PERSISTENT ASTHMA WITHOUT COMPLICATION: Primary | ICD-10-CM

## 2022-08-30 PROCEDURE — 99213 OFFICE O/P EST LOW 20 MIN: CPT | Performed by: FAMILY MEDICINE

## 2022-08-30 RX ORDER — DOCUSATE SODIUM 100 MG/1
100 CAPSULE, LIQUID FILLED ORAL
Qty: 180 CAPSULE | Refills: 0 | Status: SHIPPED | OUTPATIENT
Start: 2022-08-30

## 2022-08-30 RX ORDER — POLYETHYLENE GLYCOL 3350 17 G/17G
17 POWDER, FOR SOLUTION ORAL
Qty: 5 PACKET | Refills: 1 | Status: SHIPPED | OUTPATIENT
Start: 2022-08-30

## 2022-08-30 NOTE — PROGRESS NOTES
Marquis Boggs  25 y.o. male  2000  [address] [place] [address]  [id number]     [address] [address] 54       Encounter Date: 2022           Established Patient Visit Note: Tabitha Coto MD    Reason for Appointment:  Chief Complaint   Patient presents with    Medication Refill    Follow-up         History of Present Illness:  History provided by patient    Aranza Núñez is a 25 y.o. male who presents to clinic today for:     Headaches: patient referred to neurology on 7/15/22, but they have not yet scheduled this. They are continuing the OTC ibuprofen PRN about 3 times per month, which she reports as helpful. Neck Itching: his mother reports that she changed his soap to Aveeno, and this has helped. They are waiting to hear back for dermatology visit. Seborrheic Dermatitis: followed by [facility] dermatology. Insomnia: They have not yet scheduled visit with sleep medicine  Constipation: Followed by GI (Dr. Josh Jara). His mother reports that he was constipated about 2 weeks ago, and they gave extra miralax which cleared it up last week. His mother reports that he is only taking colace about 3 times per week because his school does not like changing dirty diapers. Asthma: Using Symbicort as prescribed. He is followed by asthma specialist (Dr. Demi Bonilla). He uses rescue inhaler about three times per month. She reports that his spacer , and he needs a new one. Autism/ADHD/DD: followed by psychiatry (Dr. Anju Rankin)  Labs ordred at prior visit, but patient did not allow these to be drawn      Review of Systems  All other ROS were reviewed and are negative except as discussed in HPI         Allergies: Pcn [penicillins] and Milk    Medications:     Current Outpatient Medications:     inhalational spacing device, Use as spacer with Symbicort inhaler.  Patient may use whichever product is covered by insurance. (J45.40) Moderate persistent asthma without complication, Disp: 1 Each, Rfl: 0    polyethylene glycol (MIRALAX) 17 gram packet, Take 1 Packet by mouth daily as needed for Constipation. , Disp: 5 Packet, Rfl: 1    docusate sodium (COLACE) 100 mg capsule, Take 1 Capsule by mouth two (2) times daily as needed for Constipation. , Disp: 180 Capsule, Rfl: 0    miconazole (MICOTIN) 2 % topical cream, Apply  to affected area two (2) times a day., Disp: 15 g, Rfl: 1    montelukast (SINGULAIR) 10 mg tablet, TAKE 1 TABLET BY MOUTH EVERY DAY for allergies and asthma, Disp: 90 Tablet, Rfl: 3    melatonin 10 mg capsule, Take 1 Capsule by mouth nightly., Disp: 90 Capsule, Rfl: 3    budesonide-formoteroL (SYMBICORT) 160-4.5 mcg/actuation HFAA, Take 2 Puffs by inhalation two (2) times a day., Disp: 1 Inhaler, Rfl: 5    albuterol (PROVENTIL HFA, VENTOLIN HFA, PROAIR HFA) 90 mcg/actuation inhaler, Take 2-4 Puffs by inhalation every four (4) hours as needed for Wheezing or Shortness of Breath., Disp: 1 Inhaler, Rfl: 3    albuterol (PROVENTIL VENTOLIN) 2.5 mg /3 mL (0.083 %) nebu, Use 3 ml via nebulizer every 4 hours as needed for wheezing or cough, Disp: 150 mL, Rfl: 1    mineral oil-hydrophil petrolat (AQUAPHOR) ointment, Apply  to affected area as needed for Dry Skin. Please dispense Aquaphor lip repair for patient, Disp: 15 g, Rfl: prn    ketoconazole (NIZORAL) 2 % topical cream, Apply  to affected area two (2) times a day., Disp: 60 g, Rfl: 2    cloNIDine HCL (CATAPRES) 0.1 mg tablet, Take 1 Tab by mouth nightly., Disp: 90 Tab, Rfl: 1    cetirizine (ZYRTEC) 10 mg tablet, Take 1 Tab by mouth daily. For allergies, Disp: 90 Tab, Rfl: 3    triamcinolone acetonide (KENALOG) 0.1 % topical cream, Apply  to affected area two (2) times a day.  use thin layer, Disp: 454 g, Rfl: 0    glycerin-mineral oil-lanolin (EUCERIN PLUS) topical cream, Apply  to affected area as needed for Dry Skin., Disp: 113 g, Rfl: prn    benztropine (COGENTIN) 0.5 mg tablet, Take 0.5 mg by mouth two (2) times a day., Disp: , Rfl:     hydrOXYzine HCl (ATARAX) 10 mg tablet, Take  by mouth three (3) times daily as needed for Itching., Disp: , Rfl:     risperiDONE (RisperDAL) 1 mg tablet, Take 1 mg by mouth three (3) times daily. , Disp: , Rfl:     dextroamphetamine-amphetamine (ADDERALL) 15 mg tablet, Take 15 mg by mouth., Disp: , Rfl:     History  Patient Care Team:  Man Davison MD as PCP - General (Family Medicine)  Man Davison MD as PCP - [facility]  Irineo Silverio MD as Physician (Pediatric Neurology)  Keith Auguste MD (Allergy)    Past Medical History: he has a past medical history of ADHD, impulsive type, Asthma, Autism, Constipation, Developmental delay, Headache, Insomnia, ODD (oppositional defiant disorder), Sleep apnea, and Weight loss (07/2018). Past Surgical History: he has a past surgical history that includes hx tonsil and adenoidectomy. Family Medical History: family history includes Asthma in his father, maternal grandmother, mother, and sister; Cancer in his maternal grandfather; Headache in his maternal grandmother; Migraines in his mother; Thyroid Cancer in his mother; Thyroid Disease in his mother. Social History: he reports that he has never smoked. He has never used smokeless tobacco. He reports that he does not drink alcohol and does not use drugs. Objective:   Visit Vitals  /72 (BP 1 Location: Left arm, BP Patient Position: Sitting, BP Cuff Size: Adult)   Pulse 100   Temp 98.6 °F (37 °C) (Temporal)   Resp 14   Ht 5' 11\" (1.803 m)   Wt 177 lb 3.2 oz (80.4 kg)   SpO2 95%   BMI 24.71 kg/m²     Wt Readings from Last 3 Encounters:   08/30/22 177 lb 3.2 oz (80.4 kg)   07/15/22 175 lb (79.4 kg)   02/16/22 184 lb (83.5 kg)       Physical Exam  Constitutional:       Appearance: Normal appearance. HENT:      Head: Normocephalic and atraumatic.       Right Ear: External ear normal.      Left Ear: External ear normal.      Nose: Nose normal.      Mouth/Throat:      Pharynx: No oropharyngeal exudate. Eyes:      General: No scleral icterus. Right eye: No discharge. Left eye: No discharge. Conjunctiva/sclera: Conjunctivae normal.      Pupils: Pupils are equal, round, and reactive to light. Neck:      Thyroid: No thyromegaly. Vascular: No JVD. Trachea: No tracheal deviation. Cardiovascular:      Rate and Rhythm: Normal rate and regular rhythm. Heart sounds: Normal heart sounds. No murmur heard. No friction rub. No gallop. Pulmonary:      Effort: Pulmonary effort is normal. No respiratory distress. Breath sounds: Normal breath sounds. No stridor. No wheezing. Musculoskeletal:         General: No tenderness or deformity. Normal range of motion. Cervical back: Normal range of motion and neck supple. Lymphadenopathy:      Cervical: No cervical adenopathy. Skin:     General: Skin is warm and dry. Neurological:      Mental Status: He is alert. Cranial Nerves: No cranial nerve deficit. Coordination: Coordination normal.      Gait: Gait is intact. Deep Tendon Reflexes: Reflexes normal.       Assessment & Plan:      ICD-10-CM ICD-9-CM    1. Moderate persistent asthma without complication  O75.05 352.72 inhalational spacing device      2. Chronic idiopathic constipation  K59.04 564.00 polyethylene glycol (MIRALAX) 17 gram packet      docusate sodium (COLACE) 100 mg capsule      3. ADHD, impulsive type  F90.1 314.01       4. Autism  F84.0 299.00       5. Developmental delay  R62.50 783.40       6. Gastroesophageal reflux disease without esophagitis  K21.9 530.81       7. Primary insomnia  F51.01 307.42         Headaches: Chronic, uncontrolled. Advised patient to neurology as previously discussed. Patient expresses understanding. Insomnia: Chronic, uncontrolled. Advised patient to schedule visit with sleep medicine as previously discussed. Patient expresses understanding.     Constipation: Acute on chronic, treat as above and advised scheduling visit with GI for further evaluation. Discussed red flag symptoms and reasons to call or go to ED  All other conditions listed above: Chronic, stable and/or managed by specialist. Will continue current treatment regimen. Discussed following up with specialist as scheduled. Discussed recommendations for diet and exercise. I have discussed the diagnosis with the patient and the intended plan as seen in the above orders. The patient has received an after-visit summary along with patient information handout. I have discussed medication side effects and warnings with the patient as well. Disposition  Follow-up and Dispositions    Return in about 3 months (around 11/30/2022) for follow up of chronic conditions.            Gisela Myers MD

## 2022-08-30 NOTE — PROGRESS NOTES
Chief Complaint   Patient presents with    Medication Refill    Follow-up     1. Have you been to the ER, urgent care clinic since your last visit? Hospitalized since your last visit? No    2. Have you seen or consulted any other health care providers outside of the [address] [place] [address] since your last visit? Include any pap smears or colon screening.  No

## 2022-09-15 ENCOUNTER — TELEPHONE (OUTPATIENT)
Dept: FAMILY MEDICINE CLINIC | Age: 22
End: 2022-09-15

## 2022-09-15 NOTE — TELEPHONE ENCOUNTER
Chief Complaint   Patient presents with    Referral Follow Up     Gave information about neurology and sleep medicine. Called Patient's mom as per PHI. Patient Name and  confirmed. Gave information about referral. She stated understanding.

## 2022-09-15 NOTE — TELEPHONE ENCOUNTER
----- Message from Asmita Jacques sent at 9/15/2022 11:09 AM EDT -----  Subject: Message to Provider    QUESTIONS  Information for Provider? requesting notations of paperwork for sleep   doctor and neurologist, needs a copy to make a appt   ---------------------------------------------------------------------------  --------------  [id number] [employer]  [phone]; OK to leave message on voicemail  ---------------------------------------------------------------------------  --------------  SCRIPT ANSWERS  Relationship to Patient? Other  Representative Name? Charlie Sharif  Is the Representative on the appropriate HIPAA document in Epic?  Yes

## 2022-10-07 ENCOUNTER — TELEPHONE (OUTPATIENT)
Dept: FAMILY MEDICINE CLINIC | Age: 22
End: 2022-10-07

## 2022-10-07 NOTE — TELEPHONE ENCOUNTER
Mother dropped off meals on wheels form for pt, scanned copy into pat's chart under pt doc / meals on wheel from 10/7/22 MR Shields  Placed in providers box

## 2022-10-10 ENCOUNTER — TELEPHONE (OUTPATIENT)
Dept: FAMILY MEDICINE CLINIC | Age: 22
End: 2022-10-10

## 2022-10-10 NOTE — TELEPHONE ENCOUNTER
----- Message from Shruthi Delgado sent at 10/10/2022 10:40 AM EDT -----  Subject: Referral Request    Reason for referral request? Mother calling to have referral sent for a   neurologist   Provider patient wants to be referred to(if known):     Provider Phone Number(if known):[phone]    Additional Information for Provider? Pt needs referral for neurologist to   make an appt.  The  that was referred earlier never called to make appt.  ---------------------------------------------------------------------------  --------------  [address] [employer]    [phone]; OK to leave message on voicemail  ---------------------------------------------------------------------------  --------------

## 2022-10-10 NOTE — TELEPHONE ENCOUNTER
Chief Complaint   Patient presents with    Referral Request     New referral for neurology      Called Patient's mother as per PHI. Patient Name and  confirmed. Patient mother stated that needs referral for neurology. Dr. Kamala Sanon gave me referral for neurology before but they did not call yet.

## 2022-10-10 NOTE — TELEPHONE ENCOUNTER
Please advise caregiver that it is her responsibility to call to schedule appt. Please provide phone number to neurology to schedule appt.      Lesley Matta MD

## 2022-10-11 NOTE — TELEPHONE ENCOUNTER
Called patient's guardian. Patient name and  verified. Gave information about neurology referral. She stated understanding.

## 2022-11-07 DIAGNOSIS — K59.04 CHRONIC IDIOPATHIC CONSTIPATION: ICD-10-CM

## 2022-11-07 RX ORDER — POLYETHYLENE GLYCOL 3350 17 G/17G
17 POWDER, FOR SOLUTION ORAL
Qty: 5 PACKET | Refills: 1 | Status: SHIPPED | OUTPATIENT
Start: 2022-11-07

## 2022-11-07 NOTE — TELEPHONE ENCOUNTER
Last Visit: 8/30/22 MD Benny Suarez  Next Appointment: None  Previous Refill Encounter(s): 8/30/22 5 + 1    Requested Prescriptions     Pending Prescriptions Disp Refills    polyethylene glycol (MIRALAX) 17 gram packet 5 Packet 1     Sig: Take 1 Packet by mouth daily as needed for Constipation. For [address] [facility] [facility] in place:   Recommendation Provided To:    Intervention Detail: New Rx: 1, reason: Patient Preference  Gap Closed?:   Intervention Accepted By:   Time Spent (min): 5

## 2023-01-09 DIAGNOSIS — K59.04 CHRONIC IDIOPATHIC CONSTIPATION: ICD-10-CM

## 2023-01-09 RX ORDER — POLYETHYLENE GLYCOL 3350 17 G/17G
17 POWDER, FOR SOLUTION ORAL
Qty: 5 PACKET | Refills: 1 | Status: SHIPPED | OUTPATIENT
Start: 2023-01-09

## 2023-01-09 NOTE — TELEPHONE ENCOUNTER
Last Visit: 8/30/22 MD Patricia Hyman  Next Appointment: None  Previous Refill Encounter(s): 11/7/22 5 + 1 (last refill 12/7/22)    Requested Prescriptions     Pending Prescriptions Disp Refills    polyethylene glycol (MIRALAX) 17 gram packet 5 Packet 1     Sig: Take 1 Packet by mouth daily as needed for Constipation. For Pharmacy Admin Tracking Only    Program: Medication Refill  CPA in place:   Recommendation Provided To:    Intervention Detail: New Rx: 1, reason: Patient Preference  Intervention Accepted By:   Nahum Manuel Closed?:   Time Spent (min): 5

## 2023-01-19 ENCOUNTER — NURSE TRIAGE (OUTPATIENT)
Dept: OTHER | Facility: CLINIC | Age: 23
End: 2023-01-19

## 2023-01-19 NOTE — TELEPHONE ENCOUNTER
Location of patient: [address] [place] [facility] with [employer]. Subjective: Caller states pt has ankle swelling and more worse in the left. Pt also has rash on Torso, back, abdomen and scratches head. Raised Hives. Grandmother states she spoke with Allergist office and was told to up Allergy medication but it is not working. Current Symptoms: Rash and ankle swelling     Onset: 2 months ago; gradual and rash is worse last few days    Associated Symptoms: NA    Pain Severity: Unsure at this time pt is nonverbal    Temperature: Denies     What has been tried: Allergy medication Singular    LMP: NA Pregnant: NA    Recommended disposition: See in Office Today or Tomorrow    Care advice provided, patient verbalizes understanding; denies any other questions or concerns; instructed to call back for any new or worsening symptoms. Patient/Caller agrees with recommended disposition; writer provided warm transfer to [facility] for appointment scheduling    Attention Provider: Thank you for allowing me to participate in the care of your patient. The patient was connected to triage in response to information provided to the [facility]. Please do not respond through this encounter as the response is not directed to a shared pool. Reason for Disposition   MILD to MODERATE ankle swelling (e.g., can't move joint normally, can't do usual activities) (Exceptions: Itchy, localized swelling; swelling is chronic.)   Mild widespread rash    Protocols used:  Ankle Swelling-ADULT-[place], Rash or Redness - Widespread-ADULT-[place]

## 2023-01-20 ENCOUNTER — TELEPHONE (OUTPATIENT)
Dept: FAMILY MEDICINE CLINIC | Age: 23
End: 2023-01-20

## 2023-01-20 ENCOUNTER — OFFICE VISIT (OUTPATIENT)
Dept: FAMILY MEDICINE CLINIC | Age: 23
End: 2023-01-20
Payer: MEDICAID

## 2023-01-20 VITALS
OXYGEN SATURATION: 97 % | RESPIRATION RATE: 16 BRPM | BODY MASS INDEX: 25.15 KG/M2 | DIASTOLIC BLOOD PRESSURE: 79 MMHG | SYSTOLIC BLOOD PRESSURE: 116 MMHG | HEART RATE: 91 BPM | HEIGHT: 71 IN | WEIGHT: 179.6 LBS | TEMPERATURE: 97.7 F

## 2023-01-20 DIAGNOSIS — L21.0 PITYRIASIS: Primary | ICD-10-CM

## 2023-01-20 RX ORDER — OMEPRAZOLE 20 MG/1
CAPSULE, DELAYED RELEASE ORAL
COMMUNITY
Start: 2023-01-14

## 2023-01-20 RX ORDER — KETOTIFEN FUMARATE 0.35 MG/ML
SOLUTION/ DROPS OPHTHALMIC
COMMUNITY
Start: 2023-01-14

## 2023-01-20 RX ORDER — TRIAMCINOLONE ACETONIDE 1 MG/G
CREAM TOPICAL 2 TIMES DAILY
Qty: 80 G | Refills: 2 | Status: SHIPPED | OUTPATIENT
Start: 2023-01-20

## 2023-01-20 RX ORDER — SALICYLIC ACID 6 MG/100ML
SHAMPOO TOPICAL
COMMUNITY
Start: 2020-12-31

## 2023-01-20 NOTE — TELEPHONE ENCOUNTER
Chief Complaint   Patient presents with    Form Completion     Serious medical condition certificate form      Called patient's grandmother as per PHI. Patient name and  verified. Informed her that form is ready for picking up from our office. She stated understanding.

## 2023-01-20 NOTE — PROGRESS NOTES
[address] [facility] Note  Subjective:      Charyl Nyhan is a 25 y.o. male who presents for rash on his chest back and few on his arms. It started on his chest first.    Past Medical History:   Diagnosis Date    ADHD, impulsive type     Asthma     Autism     Constipation     [facility]    Developmental delay     Headache      Pediatric Neuro: Dr. Lori Leal. MRI of brain normal in 2/2018. No follow up indicated. Insomnia     ODD (oppositional defiant disorder)     Sleep apnea     Weight loss 07/2018    [facility] - recomended clear ensures. Resolved 11/2018     Past Surgical History:   Procedure Laterality Date    HX TONSIL AND ADENOIDECTOMY       Current Outpatient Medications   Medication Sig Dispense Refill    ketotifen (ZADITOR) 0.025 % (0.035 %) ophthalmic solution       omeprazole (PRILOSEC) 20 mg capsule       salicylic acid 6 % sham       triamcinolone acetonide (KENALOG) 0.1 % topical cream Apply  to affected area two (2) times a day. use thin layer 80 g 2    polyethylene glycol (MIRALAX) 17 gram packet Take 1 Packet by mouth daily as needed for Constipation. 5 Packet 1    inhalational spacing device Use as spacer with Symbicort inhaler. Patient may use whichever product is covered by insurance. (J45.40) Moderate persistent asthma without complication 1 Each 0    docusate sodium (COLACE) 100 mg capsule Take 1 Capsule by mouth two (2) times daily as needed for Constipation. 180 Capsule 0    miconazole (MICOTIN) 2 % topical cream Apply  to affected area two (2) times a day. 15 g 1    montelukast (SINGULAIR) 10 mg tablet TAKE 1 TABLET BY MOUTH EVERY DAY for allergies and asthma 90 Tablet 3    melatonin 10 mg capsule Take 1 Capsule by mouth nightly. 90 Capsule 3    budesonide-formoteroL (SYMBICORT) 160-4.5 mcg/actuation HFAA Take 2 Puffs by inhalation two (2) times a day.  1 Inhaler 5    albuterol (PROVENTIL HFA, VENTOLIN HFA, PROAIR HFA) 90 mcg/actuation inhaler Take 2-4 Puffs by inhalation every four (4) hours as needed for Wheezing or Shortness of Breath. 1 Inhaler 3    albuterol (PROVENTIL VENTOLIN) 2.5 mg /3 mL (0.083 %) nebu Use 3 ml via nebulizer every 4 hours as needed for wheezing or cough 150 mL 1    mineral oil-hydrophil petrolat (AQUAPHOR) ointment Apply  to affected area as needed for Dry Skin. Please dispense Aquaphor lip repair for patient 15 g prn    ketoconazole (NIZORAL) 2 % topical cream Apply  to affected area two (2) times a day. 60 g 2    cloNIDine HCL (CATAPRES) 0.1 mg tablet Take 1 Tab by mouth nightly. 90 Tab 1    cetirizine (ZYRTEC) 10 mg tablet Take 1 Tab by mouth daily. For allergies 90 Tab 3    glycerin-mineral oil-lanolin (EUCERIN PLUS) topical cream Apply  to affected area as needed for Dry Skin. 113 g prn    benztropine (COGENTIN) 0.5 mg tablet Take 0.5 mg by mouth two (2) times a day.      hydrOXYzine HCl (ATARAX) 10 mg tablet Take  by mouth three (3) times daily as needed for Itching. risperiDONE (RisperDAL) 1 mg tablet Take 1 mg by mouth three (3) times daily. dextroamphetamine-amphetamine (ADDERALL) 15 mg tablet Take 15 mg by mouth. Allergies   Allergen Reactions    Pcn [Penicillins] Swelling    Milk Hives       ROS:   Complete review of systems was reviewed with pertinent information listed in HPI. Review of Systems   Constitutional: Negative. HENT: Negative. Respiratory: Negative. Cardiovascular: Negative. Gastrointestinal: Negative. Genitourinary: Negative. Musculoskeletal: Negative. Skin:  Positive for rash. Objective:   Visit Vitals  /79 (BP 1 Location: Left arm, BP Patient Position: Sitting, BP Cuff Size: Adult)   Pulse 91   Temp 97.7 °F (36.5 °C) (Temporal)   Resp 16   Ht 5' 11\" (1.803 m)   Wt 179 lb 9.6 oz (81.5 kg)   SpO2 97%   BMI 25.05 kg/m²       Vitals and Nurse Documentation reviewed.      Physical Exam  Constitutional: Appearance: Normal appearance. HENT:      Mouth/Throat:      Mouth: Mucous membranes are moist.   Cardiovascular:      Rate and Rhythm: Normal rate and regular rhythm. Pulses: Normal pulses. Heart sounds: Normal heart sounds. No murmur heard. Pulmonary:      Effort: Pulmonary effort is normal.      Breath sounds: Normal breath sounds. Abdominal:      General: Bowel sounds are normal.      Palpations: Abdomen is soft. Musculoskeletal:      Cervical back: Normal range of motion and neck supple. Skin:     Comments: Oval, slightly raised rash, scaly patches of rash on his trunk    Neurological:      Mental Status: He is alert. Assessment/Plan:     Diagnoses and all orders for this visit:    1. Pityriasis  -     triamcinolone acetonide (KENALOG) 0.1 % topical cream; Apply  to affected area two (2) times a day. use thin layer          Pt expressed understanding with the diagnosis and plan        Discussed expected course/resolution/complications of diagnosis in detail with patient. Medication risks/benefits/costs/interactions/alternatives discussed with patient. Pt was given an after visit summary which includes diagnoses, current medications & vitals.   Pt expressed understanding with the diagnosis and plan

## 2023-01-20 NOTE — PROGRESS NOTES
Chief Complaint   Patient presents with    Rash     Itchy rash on chest and a little on arms. 1. \"Have you been to the ER, urgent care clinic since your last visit? Hospitalized since your last visit? \" No    2. \"Have you seen or consulted any other health care providers outside of the [address] [place] [address] since your last visit? \" No     3. For patients aged 39-70: Has the patient had a colonoscopy / FIT/ Cologuard? NA - based on age      If the patient is female:    4. For patients aged 41-77: Has the patient had a mammogram within the past 2 years? NA - based on age or sex      11. For patients aged 21-65: Has the patient had a pap smear?  NA - based on age or sex         3 most recent PHQ Screens 1/20/2023   PHQ Not Done -   Little interest or pleasure in doing things Not at all   Feeling down, depressed, irritable, or hopeless Not at all   Total Score PHQ 2 0       Health Maintenance Due   Topic Date Due    Hepatitis C Screening  Never done    COVID-19 Vaccine (1) Never done    Pneumococcal 0-64 years (1 - PCV) 06/17/2007    HPV Age 9Y-34Y (3 - Male 2-dose series) 01/10/2017    DTaP/Tdap/Td series (7 - Td or Tdap) 03/19/2022    Flu Vaccine (1) 08/01/2022

## 2023-01-30 ENCOUNTER — VIRTUAL VISIT (OUTPATIENT)
Dept: FAMILY MEDICINE CLINIC | Age: 23
End: 2023-01-30
Payer: MEDICAID

## 2023-01-30 ENCOUNTER — TELEPHONE (OUTPATIENT)
Dept: FAMILY MEDICINE CLINIC | Age: 23
End: 2023-01-30

## 2023-01-30 DIAGNOSIS — U07.1 COVID-19: Primary | ICD-10-CM

## 2023-01-30 PROCEDURE — 99213 OFFICE O/P EST LOW 20 MIN: CPT | Performed by: FAMILY MEDICINE

## 2023-01-30 RX ORDER — GUAIFENESIN 600 MG/1
600 TABLET, EXTENDED RELEASE ORAL 2 TIMES DAILY
Qty: 10 TABLET | Refills: 1 | Status: SHIPPED | OUTPATIENT
Start: 2023-01-30

## 2023-01-30 NOTE — PROGRESS NOTES
Marquis Boggs  25 y.o. male  2000  [address] [place] [address]  [id number]     [address]       Encounter Date: 1/30/2023           Established Patient Visit Note: Salome Marcial MD    Reason for Appointment:  Chief Complaint   Patient presents with    Cough       History of Present Illness:  History provided by patient and mother    Calvin Welsh is a 25 y.o. male who presents today for:     COVID: His mother reports that his symptoms started last Thursday and progressed to temp on Friday. He went to the hospital on Saturday. She reports that they tried to test him for COVID and flu at the hospital, but he would not let them. His mother reports that he was advised to take ibuprofen for fever. She reports that today she receive an email from the school stating that he was exposed to [employer] on 1/24/23 and 1/27/23. She reports that she did home test for COVID about one hour ago that was positive. She reports that right now he is weak with a deep cough. She reports that he felt kind of warm today, but his last fever was Saturday. His mother reports that he also has a loose stool. He has a history of asthma, and his mother has been using his albuterol inhaler and nebulizer, which she reports as helpful. He has not had the COVID vaccine. His mother reports that he will not take any shots. Review of Systems  See HPI      Allergies: Pcn [penicillins] and Milk    Medications:     Current Outpatient Medications:     guaiFENesin ER (MUCINEX) 600 mg ER tablet, Take 1 Tablet by mouth two (2) times a day., Disp: 10 Tablet, Rfl: 1    ketotifen (ZADITOR) 0.025 % (0.035 %) ophthalmic solution, , Disp: , Rfl:     omeprazole (PRILOSEC) 20 mg capsule, , Disp: , Rfl:     salicylic acid 6 % sham, , Disp: , Rfl:     triamcinolone acetonide (KENALOG) 0.1 % topical cream, Apply  to affected area two (2) times a day.  use thin layer, Disp: 80 g, Rfl: 2    polyethylene glycol (MIRALAX) 17 gram packet, Take 1 Packet by mouth daily as needed for Constipation. , Disp: 5 Packet, Rfl: 1    inhalational spacing device, Use as spacer with Symbicort inhaler. Patient may use whichever product is covered by insurance. (J45.40) Moderate persistent asthma without complication, Disp: 1 Each, Rfl: 0    docusate sodium (COLACE) 100 mg capsule, Take 1 Capsule by mouth two (2) times daily as needed for Constipation. , Disp: 180 Capsule, Rfl: 0    miconazole (MICOTIN) 2 % topical cream, Apply  to affected area two (2) times a day., Disp: 15 g, Rfl: 1    montelukast (SINGULAIR) 10 mg tablet, TAKE 1 TABLET BY MOUTH EVERY DAY for allergies and asthma, Disp: 90 Tablet, Rfl: 3    melatonin 10 mg capsule, Take 1 Capsule by mouth nightly., Disp: 90 Capsule, Rfl: 3    budesonide-formoteroL (SYMBICORT) 160-4.5 mcg/actuation HFAA, Take 2 Puffs by inhalation two (2) times a day., Disp: 1 Inhaler, Rfl: 5    albuterol (PROVENTIL HFA, VENTOLIN HFA, PROAIR HFA) 90 mcg/actuation inhaler, Take 2-4 Puffs by inhalation every four (4) hours as needed for Wheezing or Shortness of Breath., Disp: 1 Inhaler, Rfl: 3    albuterol (PROVENTIL VENTOLIN) 2.5 mg /3 mL (0.083 %) nebu, Use 3 ml via nebulizer every 4 hours as needed for wheezing or cough, Disp: 150 mL, Rfl: 1    mineral oil-hydrophil petrolat (AQUAPHOR) ointment, Apply  to affected area as needed for Dry Skin. Please dispense Aquaphor lip repair for patient, Disp: 15 g, Rfl: prn    ketoconazole (NIZORAL) 2 % topical cream, Apply  to affected area two (2) times a day., Disp: 60 g, Rfl: 2    cloNIDine HCL (CATAPRES) 0.1 mg tablet, Take 1 Tab by mouth nightly., Disp: 90 Tab, Rfl: 1    cetirizine (ZYRTEC) 10 mg tablet, Take 1 Tab by mouth daily.  For allergies, Disp: 90 Tab, Rfl: 3    glycerin-mineral oil-lanolin (EUCERIN PLUS) topical cream, Apply  to affected area as needed for Dry Skin., Disp: 113 g, Rfl: prn    benztropine (COGENTIN) 0.5 mg tablet, Take 0.5 mg by mouth two (2) times a day., Disp: , Rfl:     hydrOXYzine HCl (ATARAX) 10 mg tablet, Take  by mouth three (3) times daily as needed for Itching., Disp: , Rfl:     risperiDONE (RisperDAL) 1 mg tablet, Take 1 mg by mouth three (3) times daily. , Disp: , Rfl:     dextroamphetamine-amphetamine (ADDERALL) 15 mg tablet, Take 15 mg by mouth., Disp: , Rfl:     History  Patient Care Team:  Dick Low MD as PCP - General (Family Medicine)  Dick Low MD as PCP - [facility]  Carmen Mendiola MD as Physician (Pediatric Neurology)  Alireza Lopez MD (Allergy)    Past Medical History: he has a past medical history of ADHD, impulsive type, Asthma, Autism, Constipation, Developmental delay, Headache, Insomnia, ODD (oppositional defiant disorder), Sleep apnea, and Weight loss (07/2018). Past Surgical History: he has a past surgical history that includes hx tonsil and adenoidectomy. Family Medical History: family history includes Asthma in his father, maternal grandmother, mother, and sister; Cancer in his maternal grandfather; Headache in his maternal grandmother; Migraines in his mother; Thyroid Cancer in his mother; Thyroid Disease in his mother. Social History: he reports that he has never smoked. He has never used smokeless tobacco. He reports that he does not drink alcohol and does not use drugs. Objective:     Physical Exam    Constitutional:       General: Not in acute distress. Appearance: Normal appearance. Not ill-appearing,  Not toxic-appearing. HENT:      Head: Normocephalic. Right Ear: External ear normal.      Left Ear: External ear normal.      Nose: Nose normal.   Eyes:      General: No scleral icterus. Right eye: No discharge. Left eye: No discharge. Extraocular Movements: Extraocular movements intact. Conjunctiva/sclera: Conjunctivae normal.   Neck:      Musculoskeletal: Normal range of motion.    Pulmonary:      Effort: Pulmonary effort is normal. No respiratory distress. Neurological:      Mental Status: Mental status is at baseline. Psychiatric:         Mood and Affect: Mood normal.         Behavior: Behavior normal.         Thought Content: Thought content normal.         Judgment: Judgment normal.       Assessment & Plan:      ICD-10-CM ICD-9-CM    1. COVID-19  U07.1 079.89 guaiFENesin ER (MUCINEX) 600 mg ER tablet        Given hx of asthma, discuss Paxlovid and Molnupiravir. However, these medications cannot be crushed and his mother reports that all medications must be crushed for him to take. Will provide mucinex for cough. Advised close monitoring. Discussed criteria for release from quarantine. Discussed red flag symptoms and reasons to call or go to ED      I was in the office while conducting this encounter. Consent:  He and/or his healthcare decision maker is aware that this patient-initiated Telehealth encounter is a billable service, with coverage as determined by his insurance carrier. He is aware that he may receive a bill and has provided verbal consent to proceed: Yes    This virtual visit was conducted via [address] Pursuant to the emergency declaration under the [address] [facility] waiver authority and the [employer] and [employer] Act, this Virtual  Visit was conducted to reduce the patient's risk of exposure to COVID-19 and provide continuity of care for an established patient. Services were provided through a video synchronous discussion virtually to substitute for in-person clinic visit. Due to this being a TeleHealth evaluation, many elements of the physical examination are unable to be assessed. Total Time: minutes: 21-30 minutes. I have discussed the diagnosis with the patient and the intended plan as seen in the above orders. The patient has received an after-visit summary along with patient information handout.   I have discussed medication side effects and warnings with the patient as well.     Disposition        Ro Kat MD

## 2023-01-31 NOTE — TELEPHONE ENCOUNTER
----- Message from Joo Gill sent at 1/30/2023  5:01 PM EST -----  Subject: Message to Provider    QUESTIONS  Information for Provider? pts mom picked up paper for [employer]   and the wrong paper was in the envelope it was the appt form and not the   paper for [employer] ,pt needs to know if the [employer] form is ready   and can pts mom pick it up   ---------------------------------------------------------------------------  --------------  [id number]  [phone]; OK to leave message on voicemail  ---------------------------------------------------------------------------  --------------  SCRIPT ANSWERS  Relationship to Patient? Parent  Representative Name? mother  Patient is under 25 and the Parent has custody? No  Is the Representative on the appropriate HIPAA document in Epic?  Yes

## 2023-02-06 ENCOUNTER — APPOINTMENT (OUTPATIENT)
Dept: GENERAL RADIOLOGY | Age: 23
End: 2023-02-06
Attending: EMERGENCY MEDICINE
Payer: MEDICAID

## 2023-02-06 ENCOUNTER — HOSPITAL ENCOUNTER (EMERGENCY)
Age: 23
Discharge: HOME | End: 2023-02-06
Attending: EMERGENCY MEDICINE
Payer: MEDICAID

## 2023-02-06 VITALS
DIASTOLIC BLOOD PRESSURE: 75 MMHG | TEMPERATURE: 98 F | HEART RATE: 94 BPM | SYSTOLIC BLOOD PRESSURE: 118 MMHG | OXYGEN SATURATION: 99 % | RESPIRATION RATE: 16 BRPM

## 2023-02-06 DIAGNOSIS — R05.1 ACUTE COUGH: ICD-10-CM

## 2023-02-06 DIAGNOSIS — U07.1 COVID-19: Primary | ICD-10-CM

## 2023-02-06 PROCEDURE — 71046 X-RAY EXAM CHEST 2 VIEWS: CPT

## 2023-02-06 PROCEDURE — 99283 EMERGENCY DEPT VISIT LOW MDM: CPT

## 2023-02-06 NOTE — Clinical Note
[address]  [address] [facility] [address]  [phone]    Work/School Note    Date: 2/6/2023    To Whom It May concern:      Marquis Boggs was seen and treated today in the emergency room by the following provider(s):  Attending Provider: Shawn Rodríguez MD  Physician Assistant: Thedore Peabody, PA. Abdulaziz Zaidi is excused from work/school on 02/06/23. He is clear to return to work/school on 02/07/23.         Sincerely,          LUIZA Nails

## 2023-02-06 NOTE — ED TRIAGE NOTES
Pt Covid + one week ago, pt continues with a cough , denies fever, mother wants him checked for pneumonia, pt autistic

## 2023-02-06 NOTE — ED PROVIDER NOTES
Please note that this dictation was completed with [employer], the [employer] voice recognition software. Quite often unanticipated grammatical, syntax, homophones, and other interpretive errors are inadvertently transcribed by the computer software. Please disregard these errors. Please excuse any errors that have escaped final proofreading. Patient is a 28-year-old male with history of autism presenting to ED for evaluation of coronavirus infection. Patient's mother provides history as he is nonverbal.  She states that he was diagnosed with COVID on 1/27 and he has had a persistent cough since so she would like him evaluated for pneumonia. She states overall his symptoms have significantly improved but he has a continued lingering cough. No longer experiencing congestion, fever. She denies any difficulty breathing. He has not been vaccinated for COVID-19. Past Medical History:   Diagnosis Date    ADHD, impulsive type     Asthma     Autism     Constipation     Evaluated by [facility]    Developmental delay     Headache      Pediatric Neuro: Dr. Emi Lawton. MRI of brain normal in 2/2018. No follow up indicated. Insomnia     ODD (oppositional defiant disorder)     Sleep apnea     Weight loss 07/2018    [facility] GI at [facility], LYUBOV - recomended clear ensures.  Resolved 11/2018       Past Surgical History:   Procedure Laterality Date    HX TONSIL AND ADENOIDECTOMY           Family History:   Problem Relation Age of Onset    Asthma Mother     Migraines Mother     Thyroid Cancer Mother     Thyroid Disease Mother     Asthma Sister     Asthma Maternal Grandmother     Headache Maternal Grandmother     Cancer Maternal Grandfather         thyroidand stomach cancer    Asthma Father        Social History     Socioeconomic History    Marital status: SINGLE     Spouse name: Not on file    Number of children: Not on file    Years of education: Not on file    Highest education level: Not on file   Occupational History    Not on file   Tobacco Use    Smoking status: Never    Smokeless tobacco: Never   Vaping Use    Vaping Use: Never used   Substance and Sexual Activity    Alcohol use: No    Drug use: No    Sexual activity: Never   Other Topics Concern    Not on file   Social History Narrative    Lives with Grandmother. Occasional uncle will be in the home. No history of abuse. Goes to school at [employer]. Enjoys school. Also involved in independent living program after school hours. Social Determinants of Health     Financial Resource Strain: Low Risk     Difficulty of Paying Living Expenses: Not hard at all   Food Insecurity: No Food Insecurity    Worried About Running Out of Food in the Last Year: Never true    Ran Out of Food in the Last Year: Never true   Transportation Needs: Not on file   Physical Activity: Not on file   Stress: Not on file   Social Connections: Not on file   Intimate Partner Violence: Not on file   Housing Stability: Not on file         ALLERGIES: Pcn [penicillins] and Milk    Review of Systems   Constitutional:  Negative for fever. HENT:  Negative for congestion and sore throat. Eyes:  Negative for visual disturbance. Respiratory:  Positive for cough. Negative for shortness of breath. Cardiovascular:  Negative for chest pain. Gastrointestinal:  Negative for abdominal pain, diarrhea, nausea and vomiting. Genitourinary:  Negative for dysuria. Musculoskeletal:  Negative for back pain. Skin:  Negative for color change. Neurological:  Negative for dizziness and headaches. Psychiatric/Behavioral:  Negative for confusion. Vitals:    02/06/23 1509   BP: 118/75   Pulse: (!) 119   Resp: 16   Temp: 98 °F (36.7 °C)   SpO2: 98%            Physical Exam  Vitals and nursing note reviewed. Constitutional:       General: He is not in acute distress. Appearance: Normal appearance. He is not ill-appearing.    HENT:      Head: Normocephalic and atraumatic. Eyes:      General: Vision grossly intact. Extraocular Movements: Extraocular movements intact. Conjunctiva/sclera: Conjunctivae normal.   Neck:      Trachea: Phonation normal.   Cardiovascular:      Rate and Rhythm: Normal rate and regular rhythm. Heart sounds: Normal heart sounds. Pulmonary:      Effort: Pulmonary effort is normal.      Breath sounds: Normal breath sounds. Abdominal:      Palpations: Abdomen is soft. Tenderness: There is no abdominal tenderness. Musculoskeletal:         General: Normal range of motion. Cervical back: Normal range of motion. Skin:     General: Skin is warm and dry. Neurological:      General: No focal deficit present. Mental Status: He is alert and oriented to person, place, and time. Medical Decision Making  This is a 24 yo M who presents to the emergency room ambulatory, initially tachycardic which resolved upon my evaluation of him, vitals signs with complaints of coronavirus infection and continued cough. Overall symptoms improving. Unvaccinated. I personally reviewed any available previous notes and chronic medical issues  On physical exam he is well-appearing, lungs CTAB. Respirations unlabored. Ambulatory without clinical changes. I ordered and interpreted the following tests: Chest x-ray without evidence of pneumonia  Interventions and Plan: Advised mother on reassuring chest x-ray, will advise continuing symptomatic care as needed and follow-up with a PCP. Return precautions outlined. Amount and/or Complexity of Data Reviewed  Independent Historian: parent  Radiology: ordered. 4:58 PM  Pt has been reevaluated. There are no new complaints, changes, or physical findings at this time. All results have been reviewed with patient and/or family. Medications have been reviewed w/ pt and/or family. Pt and/or family's questions have been answered.  Pt and/or family expressed good understanding of the dx/tx/rx and is in agreement with plan of care. Pt instructed and agreed to f/u w/ PCP and to return to ED upon further deterioration. Return precautions outlined. All questions answered at this time. Pt is stable and ready for discharge. IMPRESSION:  1. COVID-19    2. Acute cough        PLAN:  1. Discharge Medication List as of 2/6/2023  4:32 PM        2.    Follow-up Information       Follow up With Specialties Details Why Contact Info    Bola Raymundo MD Family Medicine Schedule an appointment as soon as possible for a visit   [id number] [phone]                Return to ED if worse          Procedures

## 2023-02-16 ENCOUNTER — VIRTUAL VISIT (OUTPATIENT)
Dept: FAMILY MEDICINE CLINIC | Age: 23
End: 2023-02-16
Payer: MEDICAID

## 2023-02-16 DIAGNOSIS — R05.8 POST-VIRAL COUGH SYNDROME: Primary | ICD-10-CM

## 2023-02-16 PROCEDURE — 99213 OFFICE O/P EST LOW 20 MIN: CPT | Performed by: STUDENT IN AN ORGANIZED HEALTH CARE EDUCATION/TRAINING PROGRAM

## 2023-02-16 NOTE — PROGRESS NOTES
Marquis Boggs  25 y.o. male  2000  [address] [place] [id number]  [id number]   [id number] [facility] Note  Brandyn Keane MD       Encounter Date and Time: February 20, 2023 at 4:04 PM    Consent:  He and/or the health care decision maker is aware that that he may receive a bill for this telephone service, depending on his insurance coverage, and has provided verbal consent to proceed: Yes    Chief Complaint   Patient presents with    Cold Symptoms     History of Present Illness   Jr Crane is a 25 y.o. male was evaluated by synchronous (real-time) audio-video technology from home, through the [address] [employer] Patient Portal. His mother who is his caregiver provides history as Julianne Newman is nonverbal.    Julianne Newman had COVID at the end of January but is having ongoing intermittent cough since COVID. Will have cough for a few days at a time, most often during the night. He is no longer having sinus drainage - eyes are watery the last few days and is just generally not himself. Getting mucinex - just resumed last night. The cough is dry overall. He takes symbicort daily, no SOB, no wheezing. He is more fatigued than usual. Concerned he may have a HA intermittently- cannot tell because he does not speak but will seem uncomfortable and cough is the most bothersome issue    Review of Systems   Review of Systems   Unable to perform ROS: Patient nonverbal     Vitals/Objective:     General: alert, cooperative, no distress   Mental  status: mental status: baseline, alert, vocal but without actual words   Resp: resp: normal effort, no respiratory distress, no accessory muscle use, and no coughing appreciated, no increased work of breathing   Neuro: neuro: baseline    Skin: skin: no discoloration or lesions of concern on visible areas   Due to this being a TeleHealth evaluation, many elements of the physical examination are unable to be assessed.       Assessment and Plan:     18yo M with hx of autism who is nonverbal and with persistent cough after covid diagnosis 3 weeks ago. Cough is improving somewhat - advised on symptomatic care and to RTC for any worsening in symptoms. 1. Post-viral cough syndrome  - OTC cough medication  - motrin for pain and headache     We discussed the expected course, resolution and complications of the diagnosis(es) in detail. Medication risks, benefits, costs, interactions, and alternatives were discussed as indicated. I advised him to contact the office if his condition worsens, changes or fails to improve as anticipated. He expressed understanding with the diagnosis(es) and plan. Patient understands that this encounter was a temporary measure, and the importance of further follow up and examination was emphasized. Patient verbalized understanding. Patient informed to follow up: Follow-up and Dispositions    Return for routine care with PCP. Electronically Signed: Brandyn Keane MD    Providers location when delivering service: clinic Pursuant to the emergency declaration under the [facility], [facility] waiver authority and the [employer] Act, this Virtual  Visit was conducted, with patient's consent, to reduce the patient's risk of exposure to COVID-19 and provide continuity of care for an established patient. Services were provided through a video synchronous discussion virtually to substitute for in-person clinic visit. History   Patients past medical, surgical and family histories were reviewed and updated. Past Medical History:   Diagnosis Date    ADHD, impulsive type     Asthma     Autism     Constipation     Evaluated by Peds GI at [facility], NP    Developmental delay     Headache      Pediatric Neuro: Dr. Fidel Mahan. MRI of brain normal in 2/2018. No follow up indicated.     Insomnia     ODD (oppositional defiant disorder)     Sleep apnea     Weight loss 07/2018    [facility], NP - recomended clear ensures. Resolved 11/2018     Past Surgical History:   Procedure Laterality Date    HX TONSIL AND ADENOIDECTOMY       Family History   Problem Relation Age of Onset    Asthma Mother     Migraines Mother     Thyroid Cancer Mother     Thyroid Disease Mother     Asthma Sister     Asthma Maternal Grandmother     Headache Maternal Grandmother     Cancer Maternal Grandfather         thyroidand stomach cancer    Asthma Father      Social History     Socioeconomic History    Marital status: SINGLE     Spouse name: Not on file    Number of children: Not on file    Years of education: Not on file    Highest education level: Not on file   Occupational History    Not on file   Tobacco Use    Smoking status: Never    Smokeless tobacco: Never   Vaping Use    Vaping Use: Never used   Substance and Sexual Activity    Alcohol use: No    Drug use: No    Sexual activity: Never   Other Topics Concern    Not on file   Social History Narrative    Lives with Grandmother. Occasional uncle will be in the home. No history of abuse. Goes to school at [employer]. Enjoys school. Also involved in independent living program after school hours.       Social Determinants of Health     Financial Resource Strain: Low Risk     Difficulty of Paying Living Expenses: Not hard at all   Food Insecurity: No Food Insecurity    Worried About Running Out of Food in the Last Year: Never true    Ran Out of Food in the Last Year: Never true   Transportation Needs: Not on file   Physical Activity: Not on file   Stress: Not on file   Social Connections: Not on file   Intimate Partner Violence: Not on file   Housing Stability: Not on file     Patient Active Problem List   Diagnosis Code    Developmental delay R62.50    Autism F84.0    Asthma J45.909    ADHD, impulsive type F90.1    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 10 Tablet 1    ketotifen (ZADITOR) 0.025 % (0.035 %) ophthalmic solution       omeprazole (PRILOSEC) 20 mg capsule       salicylic acid 6 % sham       triamcinolone acetonide (KENALOG) 0.1 % topical cream Apply  to affected area two (2) times a day. use thin layer 80 g 2    polyethylene glycol (MIRALAX) 17 gram packet Take 1 Packet by mouth daily as needed for Constipation. 5 Packet 1    inhalational spacing device Use as spacer with Symbicort inhaler. Patient may use whichever product is covered by insurance. (J45.40) Moderate persistent asthma without complication 1 Each 0    docusate sodium (COLACE) 100 mg capsule Take 1 Capsule by mouth two (2) times daily as needed for Constipation. 180 Capsule 0    miconazole (MICOTIN) 2 % topical cream Apply  to affected area two (2) times a day. 15 g 1    montelukast (SINGULAIR) 10 mg tablet TAKE 1 TABLET BY MOUTH EVERY DAY for allergies and asthma 90 Tablet 3    melatonin 10 mg capsule Take 1 Capsule by mouth nightly. 90 Capsule 3    budesonide-formoteroL (SYMBICORT) 160-4.5 mcg/actuation HFAA Take 2 Puffs by inhalation two (2) times a day. 1 Inhaler 5    albuterol (PROVENTIL HFA, VENTOLIN HFA, PROAIR HFA) 90 mcg/actuation inhaler Take 2-4 Puffs by inhalation every four (4) hours as needed for Wheezing or Shortness of Breath.  1 Inhaler 3    albuterol (PROVENTIL VENTOLIN) 2.5 mg /3 mL (0.083 %) nebu Use 3 ml via nebulizer every 4 hours as needed for wheezing or cough 150 mL 1    mineral oil-hydrophil petrolat (AQUAPHOR) ointment Apply  to affected area as needed for Dry Skin. Please dispense Aquaphor lip repair for patient 15 g prn    ketoconazole (NIZORAL) 2 % topical cream Apply  to affected area two (2) times a day. 60 g 2    cloNIDine HCL (CATAPRES) 0.1 mg tablet Take 1 Tab by mouth nightly. 90 Tab 1    cetirizine (ZYRTEC) 10 mg tablet Take 1 Tab by mouth daily. For allergies 90 Tab 3    glycerin-mineral oil-lanolin (EUCERIN PLUS) topical cream Apply  to affected area as needed for Dry Skin. 113 g prn    benztropine (COGENTIN) 0.5 mg tablet Take 0.5 mg by mouth two (2) times a day.      hydrOXYzine HCl (ATARAX) 10 mg tablet Take  by mouth three (3) times daily as needed for Itching. risperiDONE (RisperDAL) 1 mg tablet Take 1 mg by mouth three (3) times daily. dextroamphetamine-amphetamine (ADDERALL) 15 mg tablet Take 15 mg by mouth.        Allergies   Allergen Reactions    Pcn [Penicillins] Swelling    Milk Hives

## 2023-03-02 DIAGNOSIS — B35.6 TINEA CRURIS: ICD-10-CM

## 2023-03-02 RX ORDER — DOXYLAMINE SUCCINATE 25 MG
TABLET ORAL 2 TIMES DAILY
Qty: 15 G | Refills: 1 | Status: SHIPPED | OUTPATIENT
Start: 2023-03-02

## 2023-03-02 NOTE — TELEPHONE ENCOUNTER
Last Visit: VV 2/16/23 MD Elise Covarrubias  Next Appointment: None  Previous Refill Encounter(s): 4/18/22 15g + 1 (refilled 8/8/22)    Requested Prescriptions     Pending Prescriptions Disp Refills    miconazole (MICOTIN) 2 % topical cream 15 g 1     Sig: Apply  to affected area two (2) times a day. For Pharmacy Admin Tracking Only    Program: Medication Refill  CPA in place:   Recommendation Provided To:    Intervention Detail: New Rx: 1, reason: Patient Preference  Intervention Accepted By:   Cherie Trants Closed?:   Time Spent (min): 5

## 2023-03-24 ENCOUNTER — VIRTUAL VISIT (OUTPATIENT)
Dept: FAMILY MEDICINE CLINIC | Age: 23
End: 2023-03-24

## 2023-03-24 DIAGNOSIS — N47.1 PHIMOSIS OF PENIS: Primary | ICD-10-CM

## 2023-03-24 DIAGNOSIS — K59.00 CONSTIPATION, UNSPECIFIED CONSTIPATION TYPE: ICD-10-CM

## 2023-03-24 RX ORDER — ADHESIVE BANDAGE
30 BANDAGE TOPICAL
Qty: 473 ML | Refills: 0 | Status: SHIPPED | OUTPATIENT
Start: 2023-03-24

## 2023-03-24 NOTE — PROGRESS NOTES
Marquis Boggs  25 y.o. male  2000  [address] [place] [id number]  [id number]   [id number] [facility] Note  Raysa Yancey MD       Encounter Date and Time: March 24, 2023 at 9:16 AM    Consent:  He and/or the health care decision maker is aware that that he may receive a bill for this telephone service, depending on his insurance coverage, and has provided verbal consent to proceed: Yes    Chief Complaint   Patient presents with    Referral Follow Up    Constipation     History of Present Illness   Keaton Jackson is a 25 y.o. male was evaluated by synchronous (real-time) audio-video technology from home, through the [employer] Patient Portal.      Requesting referral for urology for yeast around penis and description phimosis. Constipation  Taking miralax daily, this is his daily regimen, but no real BM x 1 week. Seems more irritable than usual. Hasn't tried any stool softner. He is having flatulence but is straining on the toilet. Review of Systems   Review of Systems   Unable to perform ROS: Patient nonverbal     Vitals/Objective:     General: alert, cooperative, no distress, appears uncomfortable, straining on toilet   Mental  status: mental status: nonverbal   Resp: resp: normal effort and no respiratory distress   Abdomen: Appears nondistended   Skin: skin: no discoloration or lesions of concern on visible areas   Due to this being a TeleHealth evaluation, many elements of the physical examination are unable to be assessed. Assessment and Plan:     20yo M with OCD, nonverbal and cognitive impairment being seen over VV for constipation and concern for penile phimosis. He is straining on toilet during visit.  Discuss with mom concern for impaction or blockage given duration of symptoms and that he should have been brought into th office - this was how visit was scheduled - because he needed a thorough evaluation given unable to verbalize symptoms. Will trial milk of Mg x 1 but gave strict ED precautions for appearance of more discomfort or failure to have BM after milk of mg.    1. Constipation, unspecified constipation type  - magnesium hydroxide (Milk of Magnesia) 400 mg/5 mL suspension; Take 30 mL by mouth daily as needed for Constipation. Dispense: 473 mL; Refill: 0    2. Phimosis of penis  - REFERRAL TO UROLOGY         We discussed the expected course, resolution and complications of the diagnosis(es) in detail. Medication risks, benefits, costs, interactions, and alternatives were discussed as indicated. I advised him to contact the office if his condition worsens, changes or fails to improve as anticipated. He expressed understanding with the diagnosis(es) and plan. Patient understands that this encounter was a temporary measure, and the importance of further follow up and examination was emphasized. Patient verbalized understanding. Patient informed to follow up: Follow-up and Dispositions    Return in about 1 month (around 4/24/2023) for establish care with new PCP. Electronically Signed: Raysa Yancey MD    Providers location when delivering service: clinic      Pursuant to the emergency declaration under the [facility], [facility] waiver authority and the [employer] Act, this Virtual  Visit was conducted, with patient's consent, to reduce the patient's risk of exposure to COVID-19 and provide continuity of care for an established patient. Services were provided through a video synchronous discussion virtually to substitute for in-person clinic visit. History   Patients past medical, surgical and family histories were reviewed and updated.       Past Medical History:   Diagnosis Date    ADHD, impulsive type     Asthma     Autism     Constipation     Evaluated by Peds GI at [facility], [facility]    Developmental delay     Headache Pediatric Neuro: Dr. Josee Nevarez. MRI of brain normal in 2/2018. No follow up indicated. Insomnia     ODD (oppositional defiant disorder)     Sleep apnea     Weight loss 07/2018    Peds GI at [facility], NP - recomended clear ensures. Resolved 11/2018     Past Surgical History:   Procedure Laterality Date    HX TONSIL AND ADENOIDECTOMY       Family History   Problem Relation Age of Onset    Asthma Mother     Migraines Mother     Thyroid Cancer Mother     Thyroid Disease Mother     Asthma Sister     Asthma Maternal Grandmother     Headache Maternal Grandmother     Cancer Maternal Grandfather         thyroidand stomach cancer    Asthma Father      Social History     Socioeconomic History    Marital status: SINGLE     Spouse name: Not on file    Number of children: Not on file    Years of education: Not on file    Highest education level: Not on file   Occupational History    Not on file   Tobacco Use    Smoking status: Never    Smokeless tobacco: Never   Vaping Use    Vaping Use: Never used   Substance and Sexual Activity    Alcohol use: No    Drug use: No    Sexual activity: Never   Other Topics Concern    Not on file   Social History Narrative    Lives with Grandmother. Occasional uncle will be in the home. No history of abuse. Goes to school at [employer]. Enjoys school. Also involved in independent living program after school hours.       Social Determinants of Health     Financial Resource Strain: Low Risk     Difficulty of Paying Living Expenses: Not hard at all   Food Insecurity: No Food Insecurity    Worried About Running Out of Food in the Last Year: Never true    Ran Out of Food in the Last Year: Never true   Transportation Needs: Not on file   Physical Activity: Not on file   Stress: Not on file   Social Connections: Not on file   Intimate Partner Violence: Not on file   Housing Stability: Not on file     Patient Active Problem List   Diagnosis Code    Developmental delay R62.50 Autism F84.0    Asthma J45.909    ADHD, impulsive type F90.1    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 473 mL 0    miconazole (MICOTIN) 2 % topical cream Apply  to affected area two (2) times a day. 15 g 1    guaiFENesin ER (MUCINEX) 600 mg ER tablet Take 1 Tablet by mouth two (2) times a day. 10 Tablet 1    ketotifen (ZADITOR) 0.025 % (0.035 %) ophthalmic solution       omeprazole (PRILOSEC) 20 mg capsule       salicylic acid 6 % sham       triamcinolone acetonide (KENALOG) 0.1 % topical cream Apply  to affected area two (2) times a day. use thin layer 80 g 2    polyethylene glycol (MIRALAX) 17 gram packet Take 1 Packet by mouth daily as needed for Constipation. 5 Packet 1    inhalational spacing device Use as spacer with Symbicort inhaler. Patient may use whichever product is covered by insurance. (J45.40) Moderate persistent asthma without complication 1 Each 0    docusate sodium (COLACE) 100 mg capsule Take 1 Capsule by mouth two (2) times daily as needed for Constipation. 180 Capsule 0    montelukast (SINGULAIR) 10 mg tablet TAKE 1 TABLET BY MOUTH EVERY DAY for allergies and asthma 90 Tablet 3    melatonin 10 mg capsule Take 1 Capsule by mouth nightly. 90 Capsule 3    budesonide-formoteroL (SYMBICORT) 160-4.5 mcg/actuation HFAA Take 2 Puffs by inhalation two (2) times a day.  1 Inhaler 5    albuterol (PROVENTIL HFA, VENTOLIN HFA, PROAIR HFA) 90 mcg/actuation inhaler Take 2-4 Puffs by inhalation every four (4) hours as needed for Wheezing or Shortness of Breath. 1 Inhaler 3    albuterol (PROVENTIL VENTOLIN) 2.5 mg /3 mL (0.083 %) nebu Use 3 ml via nebulizer every 4 hours as needed for wheezing or cough 150 mL 1    mineral oil-hydrophil petrolat (AQUAPHOR) ointment Apply  to affected area as needed for Dry Skin. Please dispense Aquaphor lip repair for patient 15 g prn    ketoconazole (NIZORAL) 2 % topical cream Apply  to affected area two (2) times a day. 60 g 2    cloNIDine HCL (CATAPRES) 0.1 mg tablet Take 1 Tab by mouth nightly. 90 Tab 1    cetirizine (ZYRTEC) 10 mg tablet Take 1 Tab by mouth daily. For allergies 90 Tab 3    glycerin-mineral oil-lanolin (EUCERIN PLUS) topical cream Apply  to affected area as needed for Dry Skin. 113 g prn    benztropine (COGENTIN) 0.5 mg tablet Take 0.5 mg by mouth two (2) times a day.      hydrOXYzine HCl (ATARAX) 10 mg tablet Take  by mouth three (3) times daily as needed for Itching. risperiDONE (RisperDAL) 1 mg tablet Take 1 mg by mouth three (3) times daily. dextroamphetamine-amphetamine (ADDERALL) 15 mg tablet Take 15 mg by mouth.        Allergies   Allergen Reactions    Pcn [Penicillins] Swelling    Milk Hives

## 2023-03-24 NOTE — Clinical Note
Please call mom to schedule an IN OFFICE visit for est care with new PCP (ext visit), ideally in the next month or so but will just need to be first available. Thank you.

## 2023-03-28 NOTE — TELEPHONE ENCOUNTER
[facility] has been arranged, and [facility] Daxa Gomez LCSW, SAC   Follow-up to make sure form was received.

## 2023-05-01 ENCOUNTER — OFFICE VISIT (OUTPATIENT)
Dept: NEUROLOGY | Age: 23
End: 2023-05-01
Payer: MEDICAID

## 2023-05-01 VITALS
HEIGHT: 69 IN | BODY MASS INDEX: 25.92 KG/M2 | DIASTOLIC BLOOD PRESSURE: 70 MMHG | SYSTOLIC BLOOD PRESSURE: 132 MMHG | HEART RATE: 106 BPM | WEIGHT: 175 LBS | OXYGEN SATURATION: 96 %

## 2023-05-01 DIAGNOSIS — G43.009 MIGRAINE WITHOUT AURA AND WITHOUT STATUS MIGRAINOSUS, NOT INTRACTABLE: Primary | ICD-10-CM

## 2023-05-01 DIAGNOSIS — R62.50 DEVELOPMENTAL DELAY: ICD-10-CM

## 2023-05-01 DIAGNOSIS — F84.0 AUTISM: ICD-10-CM

## 2023-05-01 PROCEDURE — 99204 OFFICE O/P NEW MOD 45 MIN: CPT | Performed by: PSYCHIATRY & NEUROLOGY

## 2023-05-01 RX ORDER — SUMATRIPTAN 50 MG/1
50 TABLET, FILM COATED ORAL
Qty: 9 TABLET | Refills: 2 | Status: SHIPPED | OUTPATIENT
Start: 2023-05-01 | End: 2023-05-01

## 2023-05-01 NOTE — PROGRESS NOTES
NEUROLOGY  NEW PATIENT EVALUATION/CONSULTATION       PATIENT NAME: Taran Proctor    MRN: [id number]    REASON FOR CONSULTATION: Headaches    05/01/23      Previous records (physician notes, laboratory reports, and radiology reports) and imaging studies were reviewed and summarized. My recommendations will be communicated back to the patient's physician(s) via electronic medical record and/or by [address] mail. HISTORY OF PRESENT ILLNESS:  Taran Proctor is a 25 y.o.  male presenting for evaluation of headaches. He has a h/o autism/developmental delay, non-verbal. History is primarily obtained from his family and review of PCP records. He is here with his grandmother today. Headaches started a few years ago. Positive maternal family history of migraines in his mother, sisters, grandmother. Typical frequency of headaches is 2-3x monthly triggered by weather changes. +Nausea, photophonophobia. No associated focal deficits with headaches. He takes ibuprofen PRN which works well typically for his headaches. MRI Brain 2/2018 was reviewed without acute pathology, previously followed by Dr. Ray Douglas (no records available for review). PAST MEDICAL HISTORY:  Past Medical History:   Diagnosis Date    ADHD, impulsive type     Asthma     Autism     Constipation     Evaluated by [facility] at [facility]    Developmental delay     Headache      Pediatric Neuro: Dr. Shanthi Donis. MRI of brain normal in 2/2018. No follow up indicated. Insomnia     ODD (oppositional defiant disorder)     Sleep apnea     Weight loss 07/2018    [facility] recomended clear ensures.  Resolved 11/2018       PAST SURGICAL HISTORY:  Past Surgical History:   Procedure Laterality Date    HX TONSIL AND ADENOIDECTOMY         FAMILY HISTORY:   Family History   Problem Relation Age of Onset    Asthma Mother     Migraines Mother     Thyroid Cancer Mother     Thyroid Disease Mother     Asthma Sister Asthma Maternal Grandmother     Headache Maternal Grandmother     Cancer Maternal Grandfather         thyroidand stomach cancer    Asthma Father          SOCIAL HISTORY:  Social History     Socioeconomic History    Marital status: SINGLE   Tobacco Use    Smoking status: Never    Smokeless tobacco: Never   Vaping Use    Vaping Use: Never used   Substance and Sexual Activity    Alcohol use: No    Drug use: No    Sexual activity: Never   Social History Narrative    Lives with Grandmother. Occasional uncle will be in the home. No history of abuse. Goes to school at [employer]. Enjoys school. Also involved in independent living program after school hours. Social Determinants of Health     Financial Resource Strain: Low Risk     Difficulty of Paying Living Expenses: Not hard at all   Food Insecurity: No Food Insecurity    Worried About [address] in the Last Year: Never true    Ran Out of Food in the Last Year: Never true         MEDICATIONS:   Current Outpatient Medications   Medication Sig Dispense Refill    miconazole (MICOTIN) 2 % topical cream Apply  to affected area two (2) times a day. 15 g 1    magnesium hydroxide (Milk of Magnesia) 400 mg/5 mL suspension Take 30 mL by mouth daily as needed for Constipation. 473 mL 0    guaiFENesin ER (MUCINEX) 600 mg ER tablet Take 1 Tablet by mouth two (2) times a day. 10 Tablet 1    ketotifen (ZADITOR) 0.025 % (0.035 %) ophthalmic solution       omeprazole (PRILOSEC) 20 mg capsule       salicylic acid 6 % sham       triamcinolone acetonide (KENALOG) 0.1 % topical cream Apply  to affected area two (2) times a day. use thin layer 80 g 2    polyethylene glycol (MIRALAX) 17 gram packet Take 1 Packet by mouth daily as needed for Constipation. 5 Packet 1    inhalational spacing device Use as spacer with Symbicort inhaler.  Patient may use whichever product is covered by insurance. (J45.40) Moderate persistent asthma without complication 1 Each 0    docusate sodium (COLACE) 100 mg capsule Take 1 Capsule by mouth two (2) times daily as needed for Constipation. 180 Capsule 0    montelukast (SINGULAIR) 10 mg tablet TAKE 1 TABLET BY MOUTH EVERY DAY for allergies and asthma 90 Tablet 3    melatonin 10 mg capsule Take 1 Capsule by mouth nightly. 90 Capsule 3    budesonide-formoteroL (SYMBICORT) 160-4.5 mcg/actuation HFAA Take 2 Puffs by inhalation two (2) times a day. 1 Inhaler 5    albuterol (PROVENTIL HFA, VENTOLIN HFA, PROAIR HFA) 90 mcg/actuation inhaler Take 2-4 Puffs by inhalation every four (4) hours as needed for Wheezing or Shortness of Breath. 1 Inhaler 3    albuterol (PROVENTIL VENTOLIN) 2.5 mg /3 mL (0.083 %) nebu Use 3 ml via nebulizer every 4 hours as needed for wheezing or cough 150 mL 1    mineral oil-hydrophil petrolat (AQUAPHOR) ointment Apply  to affected area as needed for Dry Skin. Please dispense Aquaphor lip repair for patient 15 g prn    ketoconazole (NIZORAL) 2 % topical cream Apply  to affected area two (2) times a day. 60 g 2    cloNIDine HCL (CATAPRES) 0.1 mg tablet Take 1 Tab by mouth nightly. 90 Tab 1    cetirizine (ZYRTEC) 10 mg tablet Take 1 Tab by mouth daily. For allergies 90 Tab 3    glycerin-mineral oil-lanolin (EUCERIN PLUS) topical cream Apply  to affected area as needed for Dry Skin. 113 g prn    benztropine (COGENTIN) 0.5 mg tablet Take 0.5 mg by mouth two (2) times a day.      hydrOXYzine HCl (ATARAX) 10 mg tablet Take  by mouth three (3) times daily as needed for Itching. risperiDONE (RisperDAL) 1 mg tablet Take 1 mg by mouth three (3) times daily. dextroamphetamine-amphetamine (ADDERALL) 15 mg tablet Take 15 mg by mouth. ALLERGIES:  Allergies   Allergen Reactions    Pcn [Penicillins] Swelling    Milk Hives         REVIEW OF SYSTEMS:  10 point ROS reviewed with patient. Please see scanned document under media.        PHYSICAL EXAM:  Vital Signs: Visit Vitals  /70   Pulse (!) 106   Ht 5' 9\" (1.753 m)   Wt 175 lb (79.4 kg)   SpO2 96%   BMI 25.84 kg/m²        General Medical Exam:  General:  Well appearing, comfortable, in no apparent distress. Eyes/ENT: see cranial nerve examination. Neck: No masses appreciated. Full range of motion without tenderness. Respiratory:  Clear to auscultation, good air entry bilaterally. Cardiac:  Regular rate and rhythm, no murmur. GI:  Soft, non-tender, non-distended abdomen. Bowel sounds normal. No masses, organomegaly. Extremities:  No deformities, edema, or skin discoloration. Skin:  No rashes or lesions. Neurological:  Mental Status: Alert, slightly agitated, compulsive behaviors  Cranial Nerves:   CNII/III/IV/VI: blink to threat b/l, EOMI, PERRL, no ptosis or nystagmus. CN V: Facial sensation intact bilaterally  CN VII: Unable to assess, non-cooperative  CN VIII: Unable to assess  CN IX/X: Unable to assess  CN XI: Unable to assess  CN XII: Unable to assess  Motor:AG x 4, normal tone  MSRs: 2/4 throughout, plantar response downgoing b/l    Sensation: Grossly intact to LT  Coordination: Unable to assess. Non-cooperative. Gait: Normal native gait. PERTINENT DATA:  INTERNAL RECORDS:  The patient's electronic medical record was reviewed. The relevant details include:  MRI Results (most recent):  Results from [place] encounter on 02/12/18    MRI BRAIN W WO CONT    Narrative  EXAM:  MRI BRAIN W WO CONT  INDICATION: Chronic headache, Macrocephaly, Q 75.3, weight loss, autism. TECHNIQUE: Sagittal T1, axial FLAIR, T2, T1 and gradient echo T2-weighted images  of the head were obtained followed by intravenous infusion 7 mL Gadavist repeat  axial and sagittal 3-D MP rage T1 volume acquisition with thin axial and coronal  reconstructed T1-weighted images and axial diffusion weighted images. Sagittal acquired gated phase contrast CSF flow evaluation with attention to  nasal cistern and foramen magnum.   Examination performed with anesthesia/sedation by anesthesiology. COMPARISON: MRI of the head 2/17/12  FINDINGS:  The ventricular size and configuration are within normal limits. Again noted is  a small cavum septum lucidum. Normal signal demonstrated in the cerebral hemispheres, brain stem and  cerebellum. No abnormal areas of intracranial enhancement. No abnormal diffusion. No evidence of intracranial hemorrhage, acute infarct, mass or abnormal  extra-axial fluid collections. Normal flow-voids are present in the vertebral, basilar and carotid artery  systems. The craniocervical junction is normal. Normal CSF flow at the posterior  fossa/foramen magnum. Mild mucosal thickening paranasal sinuses most prominent right maxillary sinus. Impression  IMPRESSION: Normal MRI of the head. ASSESSMENT:      ICD-10-CM ICD-9-CM    1. Migraine without aura and without status migrainosus, not intractable  G43.009 346.10       2. Developmental delay  R62.50 783.40       3. Autism  F84.0 299.00       25year old male with a h/o autism, developmental delay, baseline non-verbal referred for evaluation of headaches over the past several years which appear sporadic and most consistent with intermittent migraine. Suspect a hereditary component due to maternal family history of migraine. Neurological examination is somewhat limited due to inability to cooperate fully, however appears largely non-focal. MRI Brain 2/2018 was reviewed without acute pathology, previously followed by Dr. Dawn Mccann (Pediatric Neurology). Will request outside records for review. Advise use of sumatriptan for migraine rescue therapy sparingly. If migraine frequency increases to 4+ times monthly, may consider initiation of preventative therapy. PLAN:  Obtain prior records from pediatric Neurology  Trial of sumatriptan 50mg PRN for migraine rescue therapy       Follow-up and Dispositions    Return in about 6 months (around 11/1/2023).          I have discussed the diagnosis with the patient today and the intended plan as seen in the above orders with both the patient as well as referring provider and/or PCP via electronic correspondence. The patient has received an after-visit summary and questions were answered concerning future plans. I have discussed medication side effects and warnings with the patient as well. Sarah Paz DO  Staff Neurologist  [employer]ipl[employer]ate, [address] [place] [address] Board of Psychiatry & Neurology       CC Referring provider:    Claudia Mccauley MD

## 2023-05-08 ENCOUNTER — TELEPHONE (OUTPATIENT)
Age: 23
End: 2023-05-08

## 2023-06-22 ENCOUNTER — OFFICE VISIT (OUTPATIENT)
Age: 23
End: 2023-06-22
Payer: COMMERCIAL

## 2023-06-22 VITALS
DIASTOLIC BLOOD PRESSURE: 77 MMHG | HEART RATE: 68 BPM | BODY MASS INDEX: 26.07 KG/M2 | HEIGHT: 69 IN | TEMPERATURE: 98.1 F | SYSTOLIC BLOOD PRESSURE: 112 MMHG | OXYGEN SATURATION: 98 % | WEIGHT: 176 LBS

## 2023-06-22 DIAGNOSIS — F90.1 ATTENTION-DEFICIT HYPERACTIVITY DISORDER, PREDOMINANTLY HYPERACTIVE TYPE: ICD-10-CM

## 2023-06-22 DIAGNOSIS — Z11.1 SCREENING EXAMINATION FOR PULMONARY TUBERCULOSIS: ICD-10-CM

## 2023-06-22 DIAGNOSIS — F42.9 OBSESSIVE-COMPULSIVE DISORDER, UNSPECIFIED TYPE: ICD-10-CM

## 2023-06-22 DIAGNOSIS — F84.0 AUTISTIC DISORDER: ICD-10-CM

## 2023-06-22 DIAGNOSIS — Z02.0 ENCOUNTER FOR EXAMINATION FOR ADMISSION TO EDUCATIONAL INSTITUTION: Primary | ICD-10-CM

## 2023-06-22 PROCEDURE — 99214 OFFICE O/P EST MOD 30 MIN: CPT | Performed by: INTERNAL MEDICINE

## 2023-06-22 SDOH — ECONOMIC STABILITY: FOOD INSECURITY: WITHIN THE PAST 12 MONTHS, THE FOOD YOU BOUGHT JUST DIDN'T LAST AND YOU DIDN'T HAVE MONEY TO GET MORE.: PATIENT DECLINED

## 2023-06-22 SDOH — ECONOMIC STABILITY: FOOD INSECURITY: WITHIN THE PAST 12 MONTHS, YOU WORRIED THAT YOUR FOOD WOULD RUN OUT BEFORE YOU GOT MONEY TO BUY MORE.: PATIENT DECLINED

## 2023-06-22 SDOH — ECONOMIC STABILITY: HOUSING INSECURITY
IN THE LAST 12 MONTHS, WAS THERE A TIME WHEN YOU DID NOT HAVE A STEADY PLACE TO SLEEP OR SLEPT IN A SHELTER (INCLUDING NOW)?: PATIENT REFUSED

## 2023-06-22 SDOH — ECONOMIC STABILITY: INCOME INSECURITY: HOW HARD IS IT FOR YOU TO PAY FOR THE VERY BASICS LIKE FOOD, HOUSING, MEDICAL CARE, AND HEATING?: PATIENT DECLINED

## 2023-06-22 ASSESSMENT — ENCOUNTER SYMPTOMS
CHEST TIGHTNESS: 0
WHEEZING: 0
SHORTNESS OF BREATH: 0
DIARRHEA: 0
ABDOMINAL PAIN: 0
VOMITING: 0
BACK PAIN: 0
COUGH: 0
EYE ITCHING: 0
COLOR CHANGE: 0
SORE THROAT: 0
SINUS PRESSURE: 0
NAUSEA: 0

## 2023-06-22 NOTE — PROGRESS NOTES
Marquis Lock (:  2000) is a 26 y.o. male,Established patient, here for evaluation of the following chief complaint(s): Annual Exam (Needs TB screening for day support. Pt does not tolerate bloodwork or injections.)      Subjective   SUBJECTIVE/OBJECTIVE  HPI : Patient here accompanied by his mom to get forms filled out and tuberculosis screening done for admission to a day support program.  Mom does not know as yet which program it would be. Patient has severe history of needle phobia and would not be able to get QuantiFERON done. We will have to order a chest x-ray because of that reason. Patient is nonverbal and mom is the main caregiver and giving the history. Denies any new concerns or complaints. Has been eating well and sleeping well. Has not had any recent outbursts or mood swings. Denies any allergy symptoms nausea vomiting abdominal pain urinary symptoms. Has been taking his medications as prescribed. Has been seeing his psychiatrist on a regular basis and taking medications as prescribed. Past Medical History:   Diagnosis Date    ADHD, impulsive type     Asthma     Autism     Constipation     Evaluated by Peds GI at [facility] NP    Developmental delay     Headache      Pediatric Neuro: Dr. Tianna Milligan. MRI of brain normal in 2018. No follow up indicated. Insomnia     ODD (oppositional defiant disorder)     Sleep apnea     Weight loss 2018    Peds GI at [facility] NP - recomended clear ensures.  Resolved 2018          Current Outpatient Medications:     albuterol sulfate HFA (PROVENTIL;VENTOLIN;PROAIR) 108 (90 Base) MCG/ACT inhaler, Inhale 2-4 puffs into the lungs every 4 hours as needed, Disp: , Rfl:     albuterol (PROVENTIL) (2.5 MG/3ML) 0.083% nebulizer solution, Use 3 ml via nebulizer every 4 hours as needed for wheezing or cough, Disp: , Rfl:     amphetamine-dextroamphetamine (ADDERALL) 15 MG tablet, Take 1 tablet by mouth., Disp: , Rfl:

## 2023-06-22 NOTE — PROGRESS NOTES
Chief Complaint   Patient presents with    Annual Exam     Needs TB screening for day support. Pt does not tolerate bloodwork or injections. 1. \"Have you been to the ER, urgent care clinic since your last visit? Hospitalized since your last visit? \"    no    2. \"Have you seen or consulted any other health care providers outside of the [address] [place] [address] since your last visit? \"       no    3. For patients aged 39-70: Has the patient had a colonoscopy / FIT/ Cologuard?  N/a        PHQ-9  1/20/2023   Little interest or pleasure in doing things 0   Little interest or pleasure in doing things -   Feeling down, depressed, or hopeless 0   PHQ-2 Score 0   Total Score PHQ 2 -   PHQ-9 Total Score 0           Financial Resource Strain: Unknown    Difficulty of Paying Living Expenses: Patient refused      Food Insecurity: Unknown    Worried About Running Out of Food in the Last Year: Patient refused    Ran Out of Food in the Last Year: Patient refused          Health Maintenance Due   Topic Date Due    COVID-19 Vaccine (1) Never done    Pneumococcal 0-64 years Vaccine (1 - PPSV23 if available, else PCV20) 03/27/2002    Varicella vaccine (2 of 2 - 2-dose childhood series) 06/17/2005    HIV screen  Never done    HPV vaccine (3 - Male 2-dose series) 01/10/2017    Hepatitis C screen  Never done    DTaP/Tdap/Td vaccine (8 - Td or Tdap) 03/19/2022

## 2023-06-23 PROBLEM — F42.9 OBSESSIVE-COMPULSIVE DISORDER: Status: ACTIVE | Noted: 2023-06-23

## 2023-07-20 ENCOUNTER — OFFICE VISIT (OUTPATIENT)
Age: 23
End: 2023-07-20
Payer: COMMERCIAL

## 2023-07-20 VITALS
TEMPERATURE: 98.8 F | DIASTOLIC BLOOD PRESSURE: 69 MMHG | HEIGHT: 69 IN | WEIGHT: 171.2 LBS | BODY MASS INDEX: 25.36 KG/M2 | HEART RATE: 90 BPM | RESPIRATION RATE: 16 BRPM | SYSTOLIC BLOOD PRESSURE: 125 MMHG

## 2023-07-20 DIAGNOSIS — F84.0 AUTISM: Primary | ICD-10-CM

## 2023-07-20 DIAGNOSIS — L20.82 FLEXURAL ECZEMA: ICD-10-CM

## 2023-07-20 DIAGNOSIS — F40.231 SEVERE NEEDLE PHOBIA: ICD-10-CM

## 2023-07-20 DIAGNOSIS — J30.9 ALLERGIC RHINITIS, UNSPECIFIED SEASONALITY, UNSPECIFIED TRIGGER: ICD-10-CM

## 2023-07-20 DIAGNOSIS — J45.20 MILD INTERMITTENT ASTHMA WITHOUT COMPLICATION: ICD-10-CM

## 2023-07-20 DIAGNOSIS — K21.9 GASTROESOPHAGEAL REFLUX DISEASE WITHOUT ESOPHAGITIS: ICD-10-CM

## 2023-07-20 DIAGNOSIS — B35.6 TINEA CRURIS: ICD-10-CM

## 2023-07-20 DIAGNOSIS — K59.04 CHRONIC IDIOPATHIC CONSTIPATION: ICD-10-CM

## 2023-07-20 DIAGNOSIS — R62.50 DEVELOPMENTAL DELAY: ICD-10-CM

## 2023-07-20 PROBLEM — J20.9 BRONCHITIS, SUBACUTE: Status: RESOLVED | Noted: 2021-07-13 | Resolved: 2023-07-20

## 2023-07-20 PROCEDURE — 99214 OFFICE O/P EST MOD 30 MIN: CPT | Performed by: STUDENT IN AN ORGANIZED HEALTH CARE EDUCATION/TRAINING PROGRAM

## 2023-07-20 RX ORDER — KETOCONAZOLE 20 MG/G
CREAM TOPICAL 2 TIMES DAILY PRN
Qty: 60 G | Refills: 1 | Status: SHIPPED | OUTPATIENT
Start: 2023-07-20

## 2023-07-20 RX ORDER — TRIAMCINOLONE ACETONIDE 1 MG/G
CREAM TOPICAL 2 TIMES DAILY PRN
Qty: 80 G | Refills: 1 | Status: SHIPPED | OUTPATIENT
Start: 2023-07-20

## 2023-07-20 NOTE — PROGRESS NOTES
Marquis Lock  26 y.o. male  2000  [address] [place] [address]  [id number]     [address] [facility]       Chief Complaint: est care for chronic conditions  Source: mom, the medical record     Subjective  Lorene Ruvalcaba is an 26 y.o. male who presents to est care with new PCP. Recently saw Dr Minnie Perez for CPE for day program    Autism  - sees Dr Bakari Nioxn [phone] on risperdal 1mg TID, clonidine 01.mg daily, cogentin 0.5mg BID, Adderall 15mg daily, atarax 10mg TID PRN  - about to establish care with [facility] neurology has appt Nov 1    GERD  - taking omeprazole 20mg daily    AR/RAD  - taking singulair 10mg daily  - guaifenesin 100mg BID  - ketoifen 0.025% daily through ophthalmology for AR ([facility] opthalmology)  - albuterol use infrequent up to a couple of times a week    Chronic constipation  - miralax 17g qD  - on docusate 100mg BID prn  - no recent issues with consption; normally has a BM once to twice a week. Will hold it and then have very large BM    Review of Systems   Unable to perform ROS: Psychiatric disorder       Allergies - reviewed:    Allergies   Allergen Reactions    Penicillins Swelling    Lac Bovis Hives         Medications - reviewed:   Current Outpatient Medications   Medication Sig    triamcinolone (KENALOG) 0.1 % cream Apply topically 2 times daily as needed (rash) Do not use for more than 1-2 weeks at a time    ketoconazole (NIZORAL) 2 % cream Apply topically 2 times daily as needed (recurrent groin rash)    albuterol sulfate HFA (PROVENTIL;VENTOLIN;PROAIR) 108 (90 Base) MCG/ACT inhaler Inhale 2-4 puffs into the lungs every 4 hours as needed    albuterol (PROVENTIL) (2.5 MG/3ML) 0.083% nebulizer solution Use 3 ml via nebulizer every 4 hours as needed for wheezing or cough    amphetamine-dextroamphetamine (ADDERALL) 15 MG tablet Take 1 tablet by mouth.    benztropine (COGENTIN) 0.5 MG tablet Take 1 tablet by mouth 2 times daily    cetirizine

## 2023-07-28 DIAGNOSIS — B35.6 TINEA CRURIS: ICD-10-CM

## 2023-07-28 RX ORDER — KETOCONAZOLE 20 MG/G
CREAM TOPICAL 2 TIMES DAILY PRN
Qty: 60 G | Refills: 1 | Status: SHIPPED | OUTPATIENT
Start: 2023-07-28

## 2023-07-28 NOTE — TELEPHONE ENCOUNTER
----- Message from Tamara Sharmaaco sent at 7/25/2023  5:08 PM EDT -----  Subject: Refill Request    QUESTIONS  Name of Medication? ketoconazole (NIZORAL) 2 % cream  Patient-reported dosage and instructions? 2 x daily   How many days do you have left? 3  Preferred Pharmacy? [facility]/PHARMACY #[id number]  Pharmacy phone number (if available)? [phone]  ---------------------------------------------------------------------------  --------------,  Name of Medication? Other - cetirizine hydrochride oral usp 1 mg  Patient-reported dosage and instructions? 1 time at   How many days do you have left? 2  Preferred Pharmacy? [facility]/PHARMACY #[id number]  Pharmacy phone number (if available)? [phone]  ---------------------------------------------------------------------------  --------------,  Name of Medication? Other - [facility] alerarelis cetrzn 1o mg tablets   Patient-reported dosage and instructions? 1 tablet by mouth daily for   allergies   How many days do you have left? 1  Preferred Pharmacy? [facility]/PHARMACY #[id number]  Pharmacy phone number (if available)? [phone]  ---------------------------------------------------------------------------  --------------  HeenaLamiecco INFO  What is the best way for the office to contact you? OK to leave message on   voicemail  Preferred Call Back Phone Number? [phone]  ---------------------------------------------------------------------------  --------------  SCRIPT ANSWERS  Relationship to Patient? Guardian  Representative Name? Patria Palomo   Is the representative on the Communication Release of Information (PAM)   form in Epic?  Yes

## 2023-08-23 NOTE — PROGRESS NOTES
Chief Complaint   Patient presents with    Form Completion     medicaid forms      1. Have you been to the ER, urgent care clinic since your last visit? Hospitalized since your last visit? No    2. Have you seen or consulted any other health care providers outside of the [address] [place] [address] since your last visit? Include any pap smears or colon screening.      Yes Dr Queenie Naylor Pt still vomiting after Zofran administration.

## 2023-11-11 DIAGNOSIS — L20.82 FLEXURAL ECZEMA: ICD-10-CM

## 2023-11-13 NOTE — TELEPHONE ENCOUNTER
PCP: Yomaira Macias MD    Last appt: 7/20/2023     No future appointments.    Requested Prescriptions     Pending Prescriptions Disp Refills    triamcinolone (KENALOG) 0.1 % cream [Pharmacy Med Name: TRIAMCINOLONE 0.1% CREAM] 80 g 1     Sig: APPLY TOPICALLY 2 TIMES DAILY AS NEEDED (RASH) DO NOT USE FOR MORE THAN 1-2 WEEKS AT A TIME       Prior labs and Blood pressures:  BP Readings from Last 3 Encounters:   07/20/23 125/69   06/22/23 112/77   05/01/23 132/70     No results found for: \"NA\", \"K\", \"CL\", \"CO2\", \"AGAP\", \"GLU\", \"BUN\", \"CREA\", \"GFRAA\", \"CA\"  No results found for: \"HBA1C\", \"VXA6OTQL\"  No results found for: \"CHOL\", \"CHOLPOCT\", \"CHOLX\", \"CHLST\", \"CHOLV\", \"HDL\", \"HDLPOC\", \"HDLC\", \"LDL\", \"LDLC\", \"VLDLC\", \"VLDL\", \"TGLX\", \"TRIGL\"  No results found for: \"VITD3\", \"VD3RIA\"    No results found for: \"TSH\", \"TSH2\", \"TSH3\"

## 2023-11-13 NOTE — TELEPHONE ENCOUNTER
[facility] office on 11.13.23 when pt calls back please inform him that we had received a request for the Kenalog Cream.However before it can be filled pt need's an appt with .  -N

## 2023-11-17 RX ORDER — TRIAMCINOLONE ACETONIDE 1 MG/G
CREAM TOPICAL 2 TIMES DAILY PRN
Qty: 80 G | Refills: 0 | Status: SHIPPED | OUTPATIENT
Start: 2023-11-17

## 2023-11-17 NOTE — TELEPHONE ENCOUNTER
PCP: Yomaira Macias MD      Future Appointments   Date Time Provider Department Center   12/7/2023  1:00 PM Yomaira Macias MD [facility]       Requested Prescriptions     Pending Prescriptions Disp Refills    triamcinolone (KENALOG) 0.1 % cream [Pharmacy Med Name: TRIAMCINOLONE 0.1% CREAM] 80 g 1     Sig: APPLY TOPICALLY 2 TIMES DAILY AS NEEDED (RASH) DO NOT USE FOR MORE THAN 1-2 WEEKS AT A TIME

## 2023-11-17 NOTE — TELEPHONE ENCOUNTER
Spoke to pt's mom (Janessa) on PHI informed her that  was not going to approve his Kenalog Cream until he is seen.Pt's scheduled to see  on 12.7.23 @ 1:00 PM.

## 2023-12-04 RX ORDER — SUMATRIPTAN 50 MG/1
TABLET, FILM COATED ORAL
Qty: 9 TABLET | Refills: 2 | OUTPATIENT
Start: 2023-12-04

## 2023-12-05 ENCOUNTER — TELEPHONE (OUTPATIENT)
Age: 23
End: 2023-12-05

## 2023-12-05 NOTE — TELEPHONE ENCOUNTER
Pt's grandmother Jhon Jorgensen called in regard to previous note. Caller stated that 12/4 the pt went to [facility] [place] [facility], diagnosed with upper respiratory infection. She is upset pt cannot come in to see the provider today. Requested to speak with [facility].      [facility]# [phone]

## 2023-12-05 NOTE — TELEPHONE ENCOUNTER
Pt's grandmother called requesting a OV appointment with PCP for Upper respiratory infection and medication. Next available with PCP was 12/6/2023 at 9:20 am pt's grandmother declined and would like a appointment for 12/5/2023 due to pt being a special needs child, and trying to be prescribed the correct medication.  Best call back number [phone]

## 2023-12-07 ENCOUNTER — OFFICE VISIT (OUTPATIENT)
Age: 23
End: 2023-12-07
Payer: COMMERCIAL

## 2023-12-07 VITALS
OXYGEN SATURATION: 96 % | TEMPERATURE: 98 F | HEART RATE: 97 BPM | SYSTOLIC BLOOD PRESSURE: 102 MMHG | WEIGHT: 169.2 LBS | RESPIRATION RATE: 18 BRPM | DIASTOLIC BLOOD PRESSURE: 70 MMHG | BODY MASS INDEX: 23.69 KG/M2 | HEIGHT: 71 IN

## 2023-12-07 DIAGNOSIS — J06.9 VIRAL URI WITH COUGH: Primary | ICD-10-CM

## 2023-12-07 PROCEDURE — 99213 OFFICE O/P EST LOW 20 MIN: CPT | Performed by: STUDENT IN AN ORGANIZED HEALTH CARE EDUCATION/TRAINING PROGRAM

## 2023-12-07 RX ORDER — AZITHROMYCIN 250 MG/1
250 TABLET, FILM COATED ORAL SEE ADMIN INSTRUCTIONS
Qty: 6 TABLET | Refills: 0 | Status: SHIPPED | OUTPATIENT
Start: 2023-12-07 | End: 2023-12-12

## 2023-12-07 RX ORDER — BENZONATATE 200 MG/1
CAPSULE ORAL
COMMUNITY
Start: 2023-12-04

## 2023-12-07 ASSESSMENT — PATIENT HEALTH QUESTIONNAIRE - PHQ9
10. IF YOU CHECKED OFF ANY PROBLEMS, HOW DIFFICULT HAVE THESE PROBLEMS MADE IT FOR YOU TO DO YOUR WORK, TAKE CARE OF THINGS AT HOME, OR GET ALONG WITH OTHER PEOPLE: 0
SUM OF ALL RESPONSES TO PHQ QUESTIONS 1-9: 13
4. FEELING TIRED OR HAVING LITTLE ENERGY: 3
8. MOVING OR SPEAKING SO SLOWLY THAT OTHER PEOPLE COULD HAVE NOTICED. OR THE OPPOSITE, BEING SO FIGETY OR RESTLESS THAT YOU HAVE BEEN MOVING AROUND A LOT MORE THAN USUAL: 0
5. POOR APPETITE OR OVEREATING: 2
9. THOUGHTS THAT YOU WOULD BE BETTER OFF DEAD, OR OF HURTING YOURSELF: 0
SUM OF ALL RESPONSES TO PHQ9 QUESTIONS 1 & 2: 3
3. TROUBLE FALLING OR STAYING ASLEEP: 3
1. LITTLE INTEREST OR PLEASURE IN DOING THINGS: 0
SUM OF ALL RESPONSES TO PHQ QUESTIONS 1-9: 13
2. FEELING DOWN, DEPRESSED OR HOPELESS: 3
SUM OF ALL RESPONSES TO PHQ QUESTIONS 1-9: 13
7. TROUBLE CONCENTRATING ON THINGS, SUCH AS READING THE NEWSPAPER OR WATCHING TELEVISION: 0
6. FEELING BAD ABOUT YOURSELF - OR THAT YOU ARE A FAILURE OR HAVE LET YOURSELF OR YOUR FAMILY DOWN: 2
SUM OF ALL RESPONSES TO PHQ QUESTIONS 1-9: 13

## 2023-12-07 NOTE — PATIENT INSTRUCTIONS
Keep an eye on symptoms - if cough is worse or continue with the benzonate as needed fo severe cough and use mucinex 1-2 times daily through the weekend

## 2024-01-27 DIAGNOSIS — B35.6 TINEA CRURIS: ICD-10-CM

## 2024-01-29 RX ORDER — KETOCONAZOLE 20 MG/G
CREAM TOPICAL 2 TIMES DAILY PRN
Qty: 60 G | Refills: 1 | Status: SHIPPED | OUTPATIENT
Start: 2024-01-29

## 2024-01-29 NOTE — TELEPHONE ENCOUNTER
PCP: Yomaira Macias MD    Last appt: 12/7/2023     No future appointments.    Requested Prescriptions     Pending Prescriptions Disp Refills    ketoconazole (NIZORAL) 2 % cream [Pharmacy Med Name: KETOCONAZOLE 2% CREAM] 60 g 1     Sig: APPLY TOPICALLY 2 TIMES DAILY AS NEEDED (RECURRENT GROIN RASH)       Prior labs and Blood pressures:  BP Readings from Last 3 Encounters:   12/07/23 102/70   07/20/23 125/69   06/22/23 112/77     No results found for: \"NA\", \"K\", \"CL\", \"CO2\", \"AGAP\", \"GLU\", \"BUN\", \"CREA\", \"GFRAA\", \"CA\"  No results found for: \"HBA1C\", \"JNE2NHCP\"  No results found for: \"CHOL\", \"CHOLPOCT\", \"CHOLX\", \"CHLST\", \"CHOLV\", \"HDL\", \"HDLPOC\", \"HDLC\", \"LDL\", \"LDLC\", \"VLDLC\", \"VLDL\", \"TGLX\", \"TRIGL\"  No results found for: \"VITD3\", \"VD3RIA\"    No results found for: \"TSH\", \"TSH2\", \"TSH3\"

## 2024-04-23 ENCOUNTER — TELEPHONE (OUTPATIENT)
Facility: CLINIC | Age: 24
End: 2024-04-23

## 2024-04-23 NOTE — TELEPHONE ENCOUNTER
[facility])   (formerly [facility])   Phone:  [phone]      Fax:  [phone] [address], [address]  [place] [id number]    Name:  Marquis Lock  YOB: 2000    Incoming call from patient's caregiver/grandmother Dimple Sifuentes.  She is inquiring about Primary Care at Home services for Marquis Lock.  She says that  has severe autism and he now refuses to go out to a PCP office.  His former PCP was Dr. Yomaira Macias at [facility].  Patient's last visit there was on 12/7/23.    Ms. Sifuentes states that Dr. Macias has left the practice and that  is unable to go to the office to establish care with a new PCP.    This nurse explained that the due to recent staffing changes and also the Primary Care at Home currently being high, this nurse will put  on the wait list and call her when [facility] would be able to accept him.   This nurse does not know how long the wait will be.      This nurse advised Ms. Sifuentes that she can call [facility] if  needs a home visit for something like an infection or a migraine.     Hodan Gamez RN, Gerontological Nursing-BC, [facility]

## 2024-04-24 ENCOUNTER — TELEMEDICINE (OUTPATIENT)
Age: 24
End: 2024-04-24
Payer: MEDICAID

## 2024-04-24 DIAGNOSIS — J45.20 MILD INTERMITTENT ASTHMA WITHOUT COMPLICATION: Primary | ICD-10-CM

## 2024-04-24 DIAGNOSIS — F84.0 AUTISM: ICD-10-CM

## 2024-04-24 DIAGNOSIS — R62.50 DEVELOPMENTAL DELAY: ICD-10-CM

## 2024-04-24 PROCEDURE — 99214 OFFICE O/P EST MOD 30 MIN: CPT | Performed by: STUDENT IN AN ORGANIZED HEALTH CARE EDUCATION/TRAINING PROGRAM

## 2024-04-24 RX ORDER — MONTELUKAST SODIUM 10 MG/1
TABLET ORAL
Qty: 90 TABLET | Refills: 0 | Status: SHIPPED | OUTPATIENT
Start: 2024-04-24

## 2024-04-24 RX ORDER — ALBUTEROL SULFATE 90 UG/1
2-4 AEROSOL, METERED RESPIRATORY (INHALATION) EVERY 6 HOURS PRN
Qty: 18 G | Refills: 2 | Status: SHIPPED | OUTPATIENT
Start: 2024-04-24

## 2024-04-24 RX ORDER — BENZONATATE 200 MG/1
CAPSULE ORAL
Qty: 30 CAPSULE | Refills: 1 | Status: SHIPPED | OUTPATIENT
Start: 2024-04-24

## 2024-04-24 ASSESSMENT — PATIENT HEALTH QUESTIONNAIRE - PHQ9: DEPRESSION UNABLE TO ASSESS: FUNCTIONAL CAPACITY MOTIVATION LIMITS ACCURACY

## 2024-04-24 NOTE — PROGRESS NOTES
Marquis Lock  23 y.o. male  2000  [address] [place] [address]  [id number]   [facility]  Telemedicine Progress Note  Yomaira Macias MD       Encounter Date and Time: April 24, 2024 at 1:17 PM    Consent:  He and/or the health care decision maker is aware that that he may receive a bill for this telephone service, depending on his insurance coverage, and has provided verbal consent to proceed: YES    Chief Complaint   Patient presents with    Follow-up     For medications     Referral - General     Would like a primary care at home      History of Present Illness   Marquis Lock is a 23 y.o. male was evaluated by synchronous (real-time) audio-video technology from home, through the [facility], his mother provides the history.    Mom states that he has been difficult to get out of the house - she thinks its due to his OCD. She just had re-evaluation with psychiatry to discuss adjusting medications. His healthcare coordinator has also been working to find someone to come to the house to evaluate him. Mom says he hasn't slept much in 2 days and now very sleepy. Finished at his autism school last June and things have gone down hill since then. Would like to see if there is a way to have a home based PCP so can have routine in person evaluations. Coming to office has always been a struggle for .    He is having an intermittent cough - using albuterol a bit more than usual. No mucus production and does not sound wheezy. Not currently using tessalon but has in past. No fevers, chills    Review of Systems     Negative except what is mentioned in HPI    Vitals/Objective:     General: alert, cooperative, no distress, and sleepy   Mental  status: mental status: normal mood, behavior, speech, dress, motor activity   Resp: No increased WOB, speaking easily in complete sentences, cough   Neuro: No focal deficits    Skin: skin: no discoloration or lesions

## 2024-04-24 NOTE — PROGRESS NOTES
Chief Complaint   Patient presents with    Follow-up     For medications     Referral - General     Would like a primary care at home        \"Have you been to the ER, urgent care clinic since your last visit?  Hospitalized since your last visit?\"    NO    “Have you seen or consulted any other health care providers outside of [facility] since your last visit?”    NO            There were no vitals filed for this visit.   Health Maintenance Due   Topic Date Due    COVID-19 Vaccine (1) Never done    Varicella vaccine (2 of 2 - 2-dose childhood series) 2005    Pneumococcal 0-64 years Vaccine (1 of 1 - PPSV23 or PCV20) 2007    HIV screen  Never done    HPV vaccine (3 - Male 2-dose series) 01/10/2017    Hepatitis C screen  Never done    DTaP/Tdap/Td vaccine (8 - Td or Tdap) 2022      The patient, Marquis Lock, identity was verified by name and .

## 2024-04-29 ENCOUNTER — TELEPHONE (OUTPATIENT)
Age: 24
End: 2024-04-29

## 2024-04-29 NOTE — TELEPHONE ENCOUNTER
[facility] [address]  Subject: Referral Request    Reason for referral request? Home health for physical therapy  Provider patient wants to be referred to(if known):    Provider Phone Number(if known):    Additional Information for Provider? Patient moDewayne) said  the company name is [employer] and the phone number is [phone]. Patient  clay would like a call back. She said he was moving around and now  he is not moving around unless to go to the bathroom.  ---------------------------------------------------------------------------  --------------  CALL BACK INFO    [phone]; OK to leave message on voicemail  ---------------------------------------------------------------------------  --------------    Radha Cee [facility] [address]  Subject: Message to Provider    QUESTIONS  Information for Provider? Clay Musa is requesting a call back  to discuss Pt's care. Pt had a virtual visit yesterday and she would like  to discuss treatment due to someone else joining him to his visit  yesterday.  ---------------------------------------------------------------------------  --------------  CALL BACK INFO  [phone]; OK to leave message on voicemail  ---------------------------------------------------------------------------  --------------  SCRIPT ANSWERS  Relationship to Patient? Guardian  Representative Name? Dimple  Is the representative on the Communication Release of Information (PAM)  form in Epic? Yes

## 2024-04-29 NOTE — TELEPHONE ENCOUNTER
----- Message from Jessa Hoover sent at 4/26/2024 11:39 AM EDT -----  Subject: Referral Request    Reason for referral request? Pt's grandmother, Mariam Sifuentes, states   Pt needs in home care. States Pt is getting worse day by day. States he is   doing things he usually does not do, states he urinated on his self, dont   want to take shower or doesn't want anyone to wash him, doesn't want to do   or have any selfcare done, etc. Grandmother would like for someone to call   her to   Provider patient wants to be referred to(if known):     Provider Phone Number(if known):    Additional Information for Provider? If no answer of number below call   [phone] and may leave a message on this phone  ---------------------------------------------------------------------------  --------------  CALL BACK INFO    [phone]; OK to leave message on voicemail  ---------------------------------------------------------------------------  --------------

## 2024-04-30 ENCOUNTER — TELEPHONE (OUTPATIENT)
Age: 24
End: 2024-04-30

## 2024-04-30 DIAGNOSIS — R62.50 DEVELOPMENTAL DELAY: Primary | ICD-10-CM

## 2024-04-30 NOTE — TELEPHONE ENCOUNTER
Spoke with patients grandmother Mrs. Musa. I have notified her that orders for PT/OT will be faxed to [facility].

## 2024-05-01 ENCOUNTER — TELEPHONE (OUTPATIENT)
Age: 24
End: 2024-05-01

## 2024-05-01 NOTE — PROGRESS NOTES
Marquis Lock, was evaluated through a synchronous (real-time) audio-video encounter. The patient (or guardian if applicable) is aware that this is a billable service, which includes applicable co-pays. This Virtual Visit was conducted with patient's (and/or legal guardian's) consent. Patient identification was verified, and a caregiver was present when appropriate.   The patient was located at Home: [address] [place] [address] [place] [address]  Provider was located at Facility (Appt Dept): [address] [place] [address] [address]  [place] [address]  Confirm you are appropriately licensed, registered, or certified to deliver care in the state where the patient is located as indicated above. If you are not or unsure, please re-schedule the visit: Yes, I confirm.     Marquis Lock (:  2000) is a Established patient, presenting virtually for evaluation of the following: Migraines    HPI    Mr Lock is a 23 year old male being seen today virtually for a follow up. He is new to me this visit. He was last seen by Dr Vasquez on 23 for his migraines. H/o autism/developmental delay, non-verbal. He was started on Sumatriptan 50 mg as needed last visit. His records were requested from his pediatric neurologist Dr Jason. His mom is with him today over video. She reports that his OCD has gotten worse over this year. He refuses to leave the house. He lives with the grandmother. I did not see the patient today over video due to him sleeping. She states he hasn't sleep all night. He is non-verbal but she sees him holding his head most days. She's worried that he is having headaches. He doesn't communicate any pain to him. She also reports that his grandmother thinks he may be having a seizure. She describes it as \"shaking and staring\" that happened twice in the past month that lasted about 2 mins. They did not seek help.           Assessment & Plan   Below is the assessment and plan

## 2024-05-02 ENCOUNTER — TELEMEDICINE (OUTPATIENT)
Age: 24
End: 2024-05-02
Payer: MEDICAID

## 2024-05-02 DIAGNOSIS — R62.50 UNSPECIFIED LACK OF EXPECTED NORMAL PHYSIOLOGICAL DEVELOPMENT IN CHILDHOOD: ICD-10-CM

## 2024-05-02 DIAGNOSIS — G43.009 MIGRAINE WITHOUT AURA, NOT INTRACTABLE, WITHOUT STATUS MIGRAINOSUS: Primary | ICD-10-CM

## 2024-05-02 DIAGNOSIS — F84.0 AUTISTIC DISORDER: ICD-10-CM

## 2024-05-02 DIAGNOSIS — R56.9 OBSERVED SEIZURE-LIKE ACTIVITY (HCC): ICD-10-CM

## 2024-05-02 PROCEDURE — 99214 OFFICE O/P EST MOD 30 MIN: CPT

## 2024-05-02 RX ORDER — SUMATRIPTAN 50 MG/1
50 TABLET, FILM COATED ORAL AS NEEDED
Qty: 9 TABLET | Refills: 5 | Status: SHIPPED | OUTPATIENT
Start: 2024-05-02

## 2024-05-02 RX ORDER — ADHESIVE TAPE 3"X 2.3 YD
400 TAPE, NON-MEDICATED TOPICAL NIGHTLY
Qty: 180 TABLET | Refills: 1 | Status: SHIPPED | OUTPATIENT
Start: 2024-05-02

## 2024-05-02 ASSESSMENT — PATIENT HEALTH QUESTIONNAIRE - PHQ9
SUM OF ALL RESPONSES TO PHQ9 QUESTIONS 1 & 2: 0
SUM OF ALL RESPONSES TO PHQ QUESTIONS 1-9: 0
SUM OF ALL RESPONSES TO PHQ QUESTIONS 1-9: 0
2. FEELING DOWN, DEPRESSED OR HOPELESS: NOT AT ALL
SUM OF ALL RESPONSES TO PHQ QUESTIONS 1-9: 0
1. LITTLE INTEREST OR PLEASURE IN DOING THINGS: NOT AT ALL
SUM OF ALL RESPONSES TO PHQ QUESTIONS 1-9: 0

## 2024-05-08 ENCOUNTER — TELEPHONE (OUTPATIENT)
Age: 24
End: 2024-05-08

## 2024-05-08 NOTE — TELEPHONE ENCOUNTER
[facility] [place]    Reports not in network with Medicaid - pt does not want to pay for visit     Best number to reach her is [phone]

## 2024-06-04 NOTE — PROGRESS NOTES
Orlando Joyce is a 26 y.o. male evaluated via audio only technology on 5/7/2020. Consent: He and/or his health care decision maker is aware that he may receive a bill for this audio only encounter, depending on his insurance coverage, and has provided verbal consent to proceed: NA - Consent obtained within past 12 months    I communicated with the patient and/or health care decision maker about the nature and details of the following:  Assessment & Plan:   Diagnoses and all orders for this visit:    1. Folliculitis  -     mupirocin (BACTROBAN) 2 % ointment; Apply  to affected area two (2) times a day. 2. Tinea versicolor  -     ketoconazole (NIZORAL) 2 % topical cream; Apply  to affected area two (2) times a day. 3. Gastroesophageal reflux disease without esophagitis  -     famotidine (PEPCID) 40 mg/5 mL (8 mg/mL) suspension; Take 5 mL by mouth daily. 1  Subjective:   Orlando Joyce is a 26 y.o. male who was seen for Skin Problem    Acne  Patient presents for evaluation of acne. Onset was several months ago. Symptoms have been symptoms have progressed to a point and plateaued. . Lesions are described as open comedones, closed comedones and superficial pustules. Acne is primarily located on the forehead, cheeks and nose. Treatment to date has included Mupirocin and Clindamycin/Benzoyl peroxide    Dermatitis  Patient is seen with complaint of dermatitis. Patient complains of a localized skin rash located on the neck and back. Patient describes the rash as raised and itchy. The rash has been occurring/present for a few weeks and has been unchanged. Patient denies exposure to new soaps, new skin products or new medications. Prior to Admission medications    Medication Sig Start Date End Date Taking? Authorizing Provider   mupirocin (BACTROBAN) 2 % ointment Apply  to affected area two (2) times a day.  5/7/20  Yes Macrina Hutson, LYUBOV   famotidine (PEPCID) 40 mg/5 mL (8 mg/mL) Quality 226: Preventive Care And Screening: Tobacco Use: Screening And Cessation Intervention: Patient screened for tobacco use and is an ex/non-smoker suspension Take 5 mL by mouth daily. 5/7/20  Yes Mariaelena Corona NP   ketoconazole (NIZORAL) 2 % topical cream Apply  to affected area two (2) times a day. 5/7/20  Yes Mariaelena Corona NP   montelukast (SINGULAIR) 10 mg tablet TAKE 1 TABLET BY MOUTH EVERY DAY for allergies and asthma 4/25/20  Yes Mariaelena Corona NP   cetirizine (ZYRTEC) 10 mg tablet Take 1 Tab by mouth daily. For allergies 2/20/20  Yes Mariaelena Corona NP   melatonin 10 mg cap Take 10 mg by mouth nightly. 2/20/20  Yes Mariaelena Corona NP   cloNIDine HCL (CATAPRES) 0.1 mg tablet Take 1 Tab by mouth nightly. 1/16/20  Yes Mariaelena Corona NP   albuterol (PROVENTIL VENTOLIN) 2.5 mg /3 mL (0.083 %) nebu 3 mL by Nebulization route every four (4) hours as needed for Wheezing or Cough. 1/8/20  Yes Mariaelena Corona NP   albuterol (PROVENTIL HFA, VENTOLIN HFA, PROAIR HFA) 90 mcg/actuation inhaler Take 2-4 Puffs by inhalation every four (4) hours as needed for Wheezing or Shortness of Breath. 12/12/19  Yes Hellen Griffith MD   fluticasone propionate (FLONASE) 50 mcg/actuation nasal spray 1 Spray by Nasal route two (2) times a day. Patient taking differently: 1 Spray by Nasal route as needed. 12/12/19  Yes Hellen Griffith MD   clindamycin-benzoyl Peroxide (DUAC) 1.2 %(1 % base) -5 % SR topical gel Apply  to affected area nightly. 10/18/19  Yes Mariaelena Corona NP   Ibuprofen-diphenhydrAMINE (ADVIL PM) 200-38 mg tab Take 1 Tab by mouth nightly. Patient taking differently: Take 1 Tab by mouth as needed. 8/23/19  Yes Mariaelena Corona NP   triamcinolone acetonide (KENALOG) 0.1 % topical cream Apply  to affected area two (2) times a day. use thin layer 6/26/19  Yes Mariaelena Corona NP   glycerin-mineral oil-lanolin (EUCERIN PLUS) topical cream Apply  to affected area as needed for Dry Skin. 6/26/19  Yes Sydnee Wright, LYUBOV   benztropine (COGENTIN) 0.5 mg tablet Take 0.5 mg by mouth two (2) times a day.    Yes Provider, Detail Level: Detailed Historical   hydrOXYzine HCl (ATARAX) 10 mg tablet Take  by mouth three (3) times daily as needed for Itching. Yes Provider, Historical   polyethylene glycol (MIRALAX) 17 gram/dose powder Take 17 g by mouth daily. Yes Provider, Historical   risperiDONE (RISPERDAL) 1 mg tablet Take 1 mg by mouth three (3) times daily. Yes Provider, Historical   amphetamine-dextroamphetamine (ADDERALL) 15 mg tablet Take 15 mg by mouth. Yes Other, MD Kane   ondansetron (ZOFRAN ODT) 8 mg disintegrating tablet Take 1 Tab by mouth every eight (8) hours as needed for Nausea or Nausea or Vomiting. 2/28/20   Trena Benavidez NP   doxylamine-dextromethorphan (ROBITUSSIN NIGHTTIME COUGH DM) 12.5-30 mg/10 mL liqd Take 10 mL by mouth every six (6) hours as needed (cough). 1/12/20   Trena Benavidez NP     Allergies   Allergen Reactions    Pcn [Penicillins] Swelling    Milk Hives       Past Medical History:   Diagnosis Date    ADHD, impulsive type     Asthma     Autism     Constipation     Evaluated by [facility] at [facility] NP    Developmental delay     Headache      Pediatric Neuro: Dr. Bonny Gaitan. MRI of brain normal in 2/2018. No follow up indicated.  Insomnia     ODD (oppositional defiant disorder)     Sleep apnea     Weight loss 07/2018    [facility] NP - recomended clear ensures. Resolved 11/2018     Past Surgical History:   Procedure Laterality Date    HX TONSIL AND ADENOIDECTOMY         ROS   See HPI    I affirm this is a Patient-Initiated Episode with a Patient who has not had a related appointment within my department in the past 7 days or scheduled within the next 24 hours.     Total Time: minutes: 5-10 minutes    Note: not billable if this call serves to triage the patient into an appointment for the relevant concern      Maddie Chowdhury NP Quality 130: Documentation Of Current Medications In The Medical Record: Current Medications Documented Quality 431: Preventive Care And Screening: Unhealthy Alcohol Use - Screening: Patient not identified as an unhealthy alcohol user when screened for unhealthy alcohol use using a systematic screening method

## 2024-07-08 NOTE — TELEPHONE ENCOUNTER
----- Message from Enrrique Holland sent at 1/14/2022  6:36 PM EST -----  Subject: Message to Provider    QUESTIONS  Information for Provider? pt's grandmother called regarding medical forms   that were dropped 4 days ago to be signed. it is urgent.  ---------------------------------------------------------------------------  --------------  CALL BACK INFO  What is the best way for the office to contact you? OK to leave message on   voicemail  Preferred Call Back Phone Number? [phone]  ---------------------------------------------------------------------------  --------------  SCRIPT ANSWERS  Relationship to Patient?  Self Last seen 1/19/2024  Next appt 7/26/2024    Requested Prescriptions     Pending Prescriptions Disp Refills    vitamin D (ERGOCALCIFEROL) 1.25 MG (60994 UT) CAPS capsule [Pharmacy Med Name: VITAMIN D2 1.25MG(50,000 UNIT)] 12 capsule 1     Sig: TAKE 1 CAPSULE BY MOUTH ONE TIME PER WEEK

## 2024-07-30 ENCOUNTER — TELEPHONE (OUTPATIENT)
Age: 24
End: 2024-07-30

## 2024-07-30 NOTE — TELEPHONE ENCOUNTER
Kiara from [facility] called        They faxed a certificate of medical necessity for incontinence supplies on 7/5/24    Have you received the form and has it been signed      Please call [phone]

## 2024-10-11 ENCOUNTER — OFFICE VISIT (OUTPATIENT)
Age: 24
End: 2024-10-11
Payer: MEDICAID

## 2024-10-11 VITALS
HEIGHT: 71 IN | DIASTOLIC BLOOD PRESSURE: 71 MMHG | RESPIRATION RATE: 16 BRPM | HEART RATE: 78 BPM | SYSTOLIC BLOOD PRESSURE: 121 MMHG | BODY MASS INDEX: 23.77 KG/M2 | WEIGHT: 169.8 LBS

## 2024-10-11 DIAGNOSIS — Z00.00 WELLNESS EXAMINATION: Primary | ICD-10-CM

## 2024-10-11 DIAGNOSIS — H61.21 CERUMINOSIS, RIGHT: ICD-10-CM

## 2024-10-11 PROCEDURE — 99395 PREV VISIT EST AGE 18-39: CPT | Performed by: STUDENT IN AN ORGANIZED HEALTH CARE EDUCATION/TRAINING PROGRAM

## 2024-10-11 PROCEDURE — 99213 OFFICE O/P EST LOW 20 MIN: CPT | Performed by: STUDENT IN AN ORGANIZED HEALTH CARE EDUCATION/TRAINING PROGRAM

## 2024-10-11 RX ORDER — FLUTICASONE PROPIONATE AND SALMETEROL 50; 250 UG/1; UG/1
1 POWDER RESPIRATORY (INHALATION) 2 TIMES DAILY
COMMUNITY
Start: 2024-07-17

## 2024-10-11 RX ORDER — CALCIUM CARBONATE 300MG(750)
1 TABLET,CHEWABLE ORAL NIGHTLY
COMMUNITY
Start: 2024-08-31

## 2024-10-11 RX ORDER — HYDROXYZINE HYDROCHLORIDE 50 MG/1
50 TABLET, FILM COATED ORAL 3 TIMES DAILY
COMMUNITY
Start: 2024-09-30

## 2024-10-11 RX ORDER — DEXTROAMPHETAMINE SACCHARATE, AMPHETAMINE ASPARTATE, DEXTROAMPHETAMINE SULFATE AND AMPHETAMINE SULFATE 2.5; 2.5; 2.5; 2.5 MG/1; MG/1; MG/1; MG/1
10 TABLET ORAL
COMMUNITY
Start: 2024-10-05

## 2024-10-11 RX ORDER — CLONIDINE HYDROCHLORIDE 0.2 MG/1
0.2 TABLET ORAL 2 TIMES DAILY
COMMUNITY
Start: 2024-09-24

## 2024-10-11 SDOH — ECONOMIC STABILITY: FOOD INSECURITY: WITHIN THE PAST 12 MONTHS, YOU WORRIED THAT YOUR FOOD WOULD RUN OUT BEFORE YOU GOT MONEY TO BUY MORE.: NEVER TRUE

## 2024-10-11 SDOH — ECONOMIC STABILITY: FOOD INSECURITY: WITHIN THE PAST 12 MONTHS, THE FOOD YOU BOUGHT JUST DIDN'T LAST AND YOU DIDN'T HAVE MONEY TO GET MORE.: NEVER TRUE

## 2024-10-11 SDOH — ECONOMIC STABILITY: INCOME INSECURITY: HOW HARD IS IT FOR YOU TO PAY FOR THE VERY BASICS LIKE FOOD, HOUSING, MEDICAL CARE, AND HEATING?: NOT HARD AT ALL

## 2024-10-11 NOTE — PATIENT INSTRUCTIONS
Use each evening and let the solution sit in the ear for 10-15 minutes. Remove wax you can see. Do not use q tips in the ear. If you are unsure if the wax buildup has resolved you can come back in to check in the ears in about 2 weeks for an ear exam

## 2024-10-11 NOTE — PROGRESS NOTES
Chief Complaint   Patient presents with    Annual Exam     Physical /Adult Day Care Program      \"Have you been to the ER, urgent care clinic since your last visit?  Hospitalized since your last visit?\"    NO    “Have you seen or consulted any other health care providers outside our system since your last visit?”    NO            
General: No scleral icterus.        Right eye: No discharge.         Left eye: No discharge.      Extraocular Movements: Extraocular movements intact.      Conjunctiva/sclera: Conjunctivae normal.      Pupils: Pupils are equal, round, and reactive to light.   Neck:      Thyroid: No thyroid mass, thyromegaly or thyroid tenderness.   Cardiovascular:      Rate and Rhythm: Normal rate and regular rhythm.      Pulses: Normal pulses.      Heart sounds: Normal heart sounds. No murmur heard.     No friction rub. No gallop.   Pulmonary:      Effort: Pulmonary effort is normal. No respiratory distress.      Breath sounds: Normal breath sounds. No stridor. No wheezing, rhonchi or rales.   Chest:      Chest wall: No tenderness.   Abdominal:      General: Abdomen is flat. Bowel sounds are normal. There is no distension.      Palpations: Abdomen is soft. There is no mass.      Tenderness: There is no abdominal tenderness. There is no guarding or rebound.      Hernia: No hernia is present.   Musculoskeletal:         General: No swelling, tenderness, deformity or signs of injury. Normal range of motion.      Cervical back: Normal range of motion and neck supple. No rigidity or tenderness.      Right lower leg: No edema.      Left lower leg: No edema.   Lymphadenopathy:      Cervical: No cervical adenopathy.   Skin:     General: Skin is warm and dry.      Findings: No erythema, lesion or rash.   Neurological:      Mental Status: He is alert. Mental status is at baseline.      Gait: Gait normal.      Comments: Nonverbal    Psychiatric:         Behavior: Behavior is cooperative.         Thought Content: Thought content normal.         Assessment & Plan:   Mr Marquis Lock is a 24 y.o. male with history of ADHD, autism, OCD, Anxiety and intermittent asthma seen today for physical for day program acceptance.       1. Wellness examination  Updated past medical, social and family history and ordered labs, imaging, immunizations

## 2024-10-30 ENCOUNTER — TELEPHONE (OUTPATIENT)
Age: 24
End: 2024-10-30

## 2024-10-30 NOTE — TELEPHONE ENCOUNTER
Dimple Sifuentes, patient's grandmother and legal guardian called    She states his handicap parking sticker needs updated    Can you fill out for him      Please call [phone] home #

## 2024-12-31 ENCOUNTER — TELEPHONE (OUTPATIENT)
Age: 24
End: 2024-12-31

## 2024-12-31 NOTE — TELEPHONE ENCOUNTER
Patient's grandmother, caregiver called,  stating patient needs an appointment    He has been having headaches,  he has ear wax in his ears,  asthma,  and a yeast infection- his medication for this is about to run out    When can patient be seen    Please call    Home # [phone]  Mobile # [phone]    Mother Janessa phone [phone]

## 2024-12-31 NOTE — TELEPHONE ENCOUNTER
Left message to call office on grandmother's phone number. Called and spoke with mother advised Dr Macias out of office until April 2025. She was advised to have patient evaluated at [facility] for symptoms.

## 2025-03-13 ENCOUNTER — TELEPHONE (OUTPATIENT)
Age: 25
End: 2025-03-13

## 2025-03-13 NOTE — TELEPHONE ENCOUNTER
[facility]     Checking on fax they sent over to be filled out and signed for incontinance supplies         Best number to reach them is [phone]

## 2025-03-24 ENCOUNTER — TELEPHONE (OUTPATIENT)
Age: 25
End: 2025-03-24

## 2025-03-24 NOTE — TELEPHONE ENCOUNTER
Krupa with [facility] is checking the Status on a Medical Necessity form Incontinence supply , pull ups, skin sealant moisturizer    . This form was faxed over on 3/14/25    Krupa # [phone]

## 2025-03-28 ENCOUNTER — TELEPHONE (OUTPATIENT)
Age: 25
End: 2025-03-28

## 2025-03-28 NOTE — TELEPHONE ENCOUNTER
Yamile with [facility]     Checking to see if documentation was received on 3/14/25 for Medical Necessity for incontinence supply.  They will be re faxing 3/28/25    Yamile # [phone]

## 2025-08-11 ENCOUNTER — OFFICE VISIT (OUTPATIENT)
Facility: CLINIC | Age: 25
End: 2025-08-11
Payer: MEDICAID

## 2025-08-11 VITALS
HEART RATE: 88 BPM | DIASTOLIC BLOOD PRESSURE: 84 MMHG | HEIGHT: 71 IN | TEMPERATURE: 97.8 F | WEIGHT: 207.3 LBS | OXYGEN SATURATION: 97 % | RESPIRATION RATE: 16 BRPM | SYSTOLIC BLOOD PRESSURE: 130 MMHG | BODY MASS INDEX: 29.02 KG/M2

## 2025-08-11 DIAGNOSIS — K59.09 OTHER CONSTIPATION: ICD-10-CM

## 2025-08-11 DIAGNOSIS — L53.9 LEG ERYTHEMA: ICD-10-CM

## 2025-08-11 DIAGNOSIS — F51.01 PRIMARY INSOMNIA: ICD-10-CM

## 2025-08-11 DIAGNOSIS — J45.20 MILD INTERMITTENT ASTHMA WITHOUT COMPLICATION: Primary | ICD-10-CM

## 2025-08-11 PROCEDURE — 99204 OFFICE O/P NEW MOD 45 MIN: CPT | Performed by: STUDENT IN AN ORGANIZED HEALTH CARE EDUCATION/TRAINING PROGRAM

## 2025-08-11 RX ORDER — CALCIUM CARBONATE 300MG(750)
1 TABLET,CHEWABLE ORAL NIGHTLY
Qty: 90 TABLET | Refills: 1 | Status: SHIPPED | OUTPATIENT
Start: 2025-08-11

## 2025-08-11 RX ORDER — POLYETHYLENE GLYCOL 3350 17 G/17G
17 POWDER, FOR SOLUTION ORAL DAILY PRN
Qty: 238 G | Refills: 3 | Status: SHIPPED | OUTPATIENT
Start: 2025-08-11

## 2025-08-11 RX ORDER — BUDESONIDE AND FORMOTEROL FUMARATE DIHYDRATE 160; 4.5 UG/1; UG/1
AEROSOL RESPIRATORY (INHALATION)
COMMUNITY
Start: 2025-06-02

## 2025-08-11 ASSESSMENT — PATIENT HEALTH QUESTIONNAIRE - PHQ9: DEPRESSION UNABLE TO ASSESS: FUNCTIONAL CAPACITY MOTIVATION LIMITS ACCURACY
